# Patient Record
Sex: FEMALE | Race: WHITE | NOT HISPANIC OR LATINO | Employment: FULL TIME | URBAN - METROPOLITAN AREA
[De-identification: names, ages, dates, MRNs, and addresses within clinical notes are randomized per-mention and may not be internally consistent; named-entity substitution may affect disease eponyms.]

---

## 2017-01-25 ENCOUNTER — OFFICE VISIT (OUTPATIENT)
Dept: INTERNAL MEDICINE | Facility: CLINIC | Age: 42
End: 2017-01-25
Payer: COMMERCIAL

## 2017-01-25 VITALS
HEART RATE: 100 BPM | WEIGHT: 150.7 LBS | TEMPERATURE: 97.9 F | BODY MASS INDEX: 25.11 KG/M2 | SYSTOLIC BLOOD PRESSURE: 110 MMHG | HEIGHT: 65 IN | OXYGEN SATURATION: 100 % | DIASTOLIC BLOOD PRESSURE: 80 MMHG

## 2017-01-25 DIAGNOSIS — F90.0 ATTENTION DEFICIT HYPERACTIVITY DISORDER (ADHD), PREDOMINANTLY INATTENTIVE TYPE: Primary | ICD-10-CM

## 2017-01-25 DIAGNOSIS — Z00.00 ROUTINE GENERAL MEDICAL EXAMINATION AT A HEALTH CARE FACILITY: ICD-10-CM

## 2017-01-25 DIAGNOSIS — F33.0 MAJOR DEPRESSIVE DISORDER, RECURRENT EPISODE, MILD (H): ICD-10-CM

## 2017-01-25 LAB
ERYTHROCYTE [DISTWIDTH] IN BLOOD BY AUTOMATED COUNT: 13.4 % (ref 10–15)
HCT VFR BLD AUTO: 40.2 % (ref 35–47)
HGB BLD-MCNC: 13.2 G/DL (ref 11.7–15.7)
MCH RBC QN AUTO: 31.7 PG (ref 26.5–33)
MCHC RBC AUTO-ENTMCNC: 32.8 G/DL (ref 31.5–36.5)
MCV RBC AUTO: 96 FL (ref 78–100)
PLATELET # BLD AUTO: 380 10E9/L (ref 150–450)
RBC # BLD AUTO: 4.17 10E12/L (ref 3.8–5.2)
WBC # BLD AUTO: 5.4 10E9/L (ref 4–11)

## 2017-01-25 PROCEDURE — 36415 COLL VENOUS BLD VENIPUNCTURE: CPT | Performed by: INTERNAL MEDICINE

## 2017-01-25 PROCEDURE — G0145 SCR C/V CYTO,THINLAYER,RESCR: HCPCS | Performed by: INTERNAL MEDICINE

## 2017-01-25 PROCEDURE — 80053 COMPREHEN METABOLIC PANEL: CPT | Performed by: INTERNAL MEDICINE

## 2017-01-25 PROCEDURE — 99395 PREV VISIT EST AGE 18-39: CPT | Performed by: INTERNAL MEDICINE

## 2017-01-25 PROCEDURE — 85027 COMPLETE CBC AUTOMATED: CPT | Performed by: INTERNAL MEDICINE

## 2017-01-25 PROCEDURE — G0476 HPV COMBO ASSAY CA SCREEN: HCPCS | Performed by: INTERNAL MEDICINE

## 2017-01-25 PROCEDURE — 84443 ASSAY THYROID STIM HORMONE: CPT | Performed by: INTERNAL MEDICINE

## 2017-01-25 PROCEDURE — 80061 LIPID PANEL: CPT | Performed by: INTERNAL MEDICINE

## 2017-01-25 RX ORDER — DEXTROAMPHETAMINE SACCHARATE, AMPHETAMINE ASPARTATE MONOHYDRATE, DEXTROAMPHETAMINE SULFATE AND AMPHETAMINE SULFATE 2.5; 2.5; 2.5; 2.5 MG/1; MG/1; MG/1; MG/1
10 CAPSULE, EXTENDED RELEASE ORAL DAILY PRN
Qty: 30 CAPSULE | Refills: 0 | Status: SHIPPED | OUTPATIENT
Start: 2017-01-25 | End: 2017-03-31

## 2017-01-25 ASSESSMENT — PATIENT HEALTH QUESTIONNAIRE - PHQ9: 5. POOR APPETITE OR OVEREATING: SEVERAL DAYS

## 2017-01-25 ASSESSMENT — ANXIETY QUESTIONNAIRES
IF YOU CHECKED OFF ANY PROBLEMS ON THIS QUESTIONNAIRE, HOW DIFFICULT HAVE THESE PROBLEMS MADE IT FOR YOU TO DO YOUR WORK, TAKE CARE OF THINGS AT HOME, OR GET ALONG WITH OTHER PEOPLE: SOMEWHAT DIFFICULT
7. FEELING AFRAID AS IF SOMETHING AWFUL MIGHT HAPPEN: SEVERAL DAYS
5. BEING SO RESTLESS THAT IT IS HARD TO SIT STILL: SEVERAL DAYS
2. NOT BEING ABLE TO STOP OR CONTROL WORRYING: SEVERAL DAYS
6. BECOMING EASILY ANNOYED OR IRRITABLE: SEVERAL DAYS
1. FEELING NERVOUS, ANXIOUS, OR ON EDGE: SEVERAL DAYS
GAD7 TOTAL SCORE: 7
3. WORRYING TOO MUCH ABOUT DIFFERENT THINGS: SEVERAL DAYS

## 2017-01-25 NOTE — Clinical Note
[place] [address]  [facility] [address]  [phone]      February 3, 2017    Avril Piper  [address] [facility] [address]    Dear Avril,  We are happy to inform you that your PAP smear result from 1/25/17 is normal.  We are now able to do a follow up test on PAP smears. The DNA test is for HPV (Human Papilloma Virus). Cervical cancer is closely linked with certain types of HPV. Your result showed no evidence of HPV.  Therefore we recommend you return in 3 years for your next pap smear.  You will still need to return to the clinic every year for an annual exam and other preventive tests.  Please contact the clinic with any questions.  Sincerely,  Denise Jon MD

## 2017-01-25 NOTE — NURSING NOTE
"Chief Complaint   Patient presents with     Physical       Initial /80 mmHg  Wt 150 lb 11.2 oz (68.357 kg) Estimated body mass index is 24.69 kg/(m^2) as calculated from the following:    Height as of 4/22/15: 5' 5.5\" (1.664 m).    Weight as of this encounter: 150 lb 11.2 oz (68.357 kg).  BP completed using cuff size: large    "

## 2017-01-25 NOTE — PROGRESS NOTES
SUBJECTIVE:     CC: Avril Piper is an 41 year old woman who presents for preventive health visit.     Fasting physical.  Last pap 4- HPV positive. Last mammo over 5 years ago wnl per pt. Last Tdap UTD.    Healthy Habits:    Do you get at least three servings of calcium containing foods daily (dairy, green leafy vegetables, etc.)? yes    Amount of exercise or daily activities, outside of work: no    Problems taking medications regularly No    Medication side effects: No    Have you had an eye exam in the past two years? yes    Do you see a dentist twice per year? yes    Do you have sleep apnea, excessive snoring or daytime drowsiness?no      Today's PHQ-2 Score:   PHQ-2 ( 1999 [employer]) 4/3/2013 6/7/2012   Q1: Little interest or pleasure in doing things 0 0   Q2: Feeling down, depressed or hopeless 0 0   PHQ-2 Score 0 0       Abuse: Current or Past(Physical, Sexual or Emotional)- No  Do you feel safe in your environment - Yes    Social History   Substance Use Topics     Smoking status: Former Smoker     Types: Cigarettes     Smokeless tobacco: Never Used      Comment: very occasionally with friends      Alcohol Use: No     The patient does not drink >3 drinks per day nor >7 drinks per week.    Recent Labs   Lab Test  04/22/15   1045  02/27/12   0853   CHOL  209*  199   HDL  114  77   LDL  84  113   TRIG  53  45   CHOLHDLRATIO  1.8  2.6       Reviewed orders with patient.  Reviewed health maintenance and updated orders accordingly - Yes    Mammo Decision Support:  Mammogram not appropriate for this patient based on age.    Pertinent mammograms are reviewed under the imaging tab.  History of abnormal Pap smear: NO - age 30-65 PAP every 5 years with negative HPV co-testing recommended  All Histories reviewed and updated in Epic.      ROS:  C: NEGATIVE for fever, chills, change in weight  I: NEGATIVE for worrisome rashes, moles or lesions  E: NEGATIVE for vision changes or irritation  ENT: NEGATIVE for ear, mouth  "and throat problems  R: NEGATIVE for significant cough or SOB  B: NEGATIVE for masses, tenderness or discharge  CV: NEGATIVE for chest pain, palpitations or peripheral edema  GI: NEGATIVE for nausea, abdominal pain, heartburn, or change in bowel habits  : NEGATIVE for unusual urinary or vaginal symptoms. Periods are regular.  M: NEGATIVE for significant arthralgias or myalgia  N: NEGATIVE for weakness, dizziness or paresthesias  P: NEGATIVE for changes in mood or affect    Problem list, Medication list, Allergies, and Medical/Social/Surgical histories [facility] and updated as appropriate.  OBJECTIVE:     /80 mmHg  Pulse 100  Temp(Src) 97.9  F (36.6  C) (Oral)  Ht 5' 5\" (1.651 m)  Wt 150 lb 11.2 oz (68.357 kg)  BMI 25.08 kg/m2  SpO2 100%  Breastfeeding? No  EXAM:  GENERAL: healthy, alert and no distress  EYES: Eyes grossly normal to inspection, PERRL and conjunctivae and sclerae normal  HENT: ear canals and TM's normal, nose and mouth without ulcers or lesions  NECK: no adenopathy, no asymmetry, masses, or scars and thyroid normal to palpation  RESP: lungs clear to auscultation - no rales, rhonchi or wheezes  BREAST: normal without masses, tenderness or nipple discharge and no palpable axillary masses or adenopathy  CV: regular rate and rhythm, normal S1 S2, no S3 or S4, no murmur, click or rub, no peripheral edema and peripheral pulses strong  ABDOMEN: soft, nontender, no hepatosplenomegaly, no masses and bowel sounds normal   (female): normal female external genitalia, normal urethral meatus, vaginal mucosa pink, moist, well rugated, and normal cervix/adnexa/uterus without masses or discharge  MS: no gross musculoskeletal defects noted, no edema  SKIN: no suspicious lesions or rashes  NEURO: Normal strength and tone, mentation intact and speech normal  PSYCH: mentation appears normal, affect normal/bright    ASSESSMENT/PLAN:     1. Routine general medical examination at a [facility]" "facility     - Comprehensive metabolic panel (BMP + Alb, Alk Phos, ALT, AST, Total. Bili, TP)  - TSH with free T4 reflex  - CBC with platelets  - Lipid Profile with reflex to direct LDL  - Pap imaged thin layer screen with HPV - recommended age 30 - 65 years (select HPV order below)    2. Attention deficit hyperactivity disorder (ADHD), predominantly inattentive type     - amphetamine-dextroamphetamine (ADDERALL XR) 10 MG per 24 hr capsule; Take 1 capsule (10 mg) by mouth daily as needed  Dispense: 30 capsule; Refill: 0    3. Major depressive disorder, recurrent episode, mild (H)         COUNSELING:   Reviewed preventive health counseling, as reflected in patient instructions       Regular exercise       Healthy diet/nutrition    BP Screening:   Last 3 BP Readings:    BP Readings from Last 3 Encounters:   01/25/17 110/80   04/22/15 100/70   10/20/14 102/68       The following was recommended to the patient:  Re-screen BP within a year and recommended lifestyle modifications       reports that she has quit smoking. Her smoking use included Cigarettes. She has never used smokeless tobacco.    Estimated body mass index is 25.08 kg/(m^2) as calculated from the following:    Height as of this encounter: 5' 5\" (1.651 m).    Weight as of this encounter: 150 lb 11.2 oz (68.357 kg).   Weight management plan: Discussed healthy diet and exercise guidelines and patient will follow up in 12 months in clinic to re-evaluate.    Counseling Resources:  ATP IV Guidelines  Pooled Cohorts Equation Calculator  Breast Cancer Risk Calculator  FRAX Risk Assessment  ICSI Preventive Guidelines  Dietary Guidelines for Americans, 2010  USDA's MyPlate  ASA Prophylaxis  Lung CA Screening    Denise Jon MD  [facility]  "

## 2017-01-25 NOTE — MR AVS SNAPSHOT
After Visit Summary   1/25/2017    Avril Piper    MRN: [id number]           Patient Information     Date Of Birth          1975        Visit Information        Provider Department      1/25/2017 10:20 AM Denise Jon MD [facility]        Today's Diagnoses     Attention deficit hyperactivity disorder (ADHD), predominantly inattentive type    -  1     Routine general medical examination at a health care facility         Major depressive disorder, recurrent episode, mild (H)           Care Instructions      Preventive Health Recommendations  Female Ages 26 - 39  Yearly exam:   See your health care provider every year in order to    Review health changes.     Discuss preventive care.      Review your medicines if you your doctor has prescribed any.    Until age 30: Get a Pap test every three years (more often if you have had an abnormal result).    After age 30: Talk to your doctor about whether you should have a Pap test every 3 years or have a Pap test with HPV screening every 5 years.   You do not need a Pap test if your uterus was removed (hysterectomy) and you have not had cancer.  You should be tested each year for STDs (sexually transmitted diseases), if you're at risk.   Talk to your provider about how often to have your cholesterol checked.  If you are at risk for diabetes, you should have a diabetes test (fasting glucose).  Shots: Get a flu shot each year. Get a tetanus shot every 10 years.   Nutrition:     Eat at least 5 servings of fruits and vegetables each day.    Eat whole-grain bread, whole-wheat pasta and brown rice instead of white grains and rice.    Talk to your provider about Calcium and Vitamin D.     Lifestyle    Exercise at least 150 minutes a week (30 minutes a day, 5 days of the week). This will help you control your weight and prevent disease.    Limit alcohol to one drink per day.    No smoking.     Wear sunscreen to prevent skin cancer.    See your  "dentist every six months for an exam and cleaning.            Follow-ups after your visit        Who to contact     If you have questions or need follow up information about today's clinic visit or your schedule please contact [facility] directly at [phone].  Normal or non-critical lab and imaging results will be communicated to you by MyChart, letter or phone within 4 business days after the clinic has received the results. If you do not hear from us within 7 days, please contact the clinic through [employer] or phone. If you have a critical or abnormal lab result, we will notify you by phone as soon as possible.  Submit refill requests through [employer] or call your pharmacy and they will forward the refill request to us. Please allow 3 business days for your refill to be completed.          Additional Information About Your Visit        [employer] Information     [employer] lets you send messages to your doctor, view your test results, renew your prescriptions, schedule appointments and more. To sign up, go to [email or web address] . Click on \"Log in\" on the left side of the screen, which will take you to the Welcome page. Then click on \"Sign up Now\" on the right side of the page.     You will be asked to enter the access code listed below, as well as some personal information. Please follow the directions to create your username and password.     Your access code is: [email or web address]  Expires: 2017 10:57 AM     Your access code will  in 90 days. If you need help or a new code, please call your [facility] or [phone].        Care EveryWhere ID     This is your Care EveryWhere ID. This could be used by other organizations to access your [facility] medical records  [id number]        Your Vitals Were     Pulse Temperature Height BMI (Body Mass Index) Pulse Oximetry Breastfeeding?    100 97.9  F (36.6  C) (Oral) 5' 5\" (1.651 m) 25.08 kg/m2 100% No       Blood Pressure from Last 3 " Encounters:   01/25/17 110/80   04/22/15 100/70   10/20/14 102/68    Weight from Last 3 Encounters:   01/25/17 150 lb 11.2 oz (68.357 kg)   04/22/15 159 lb 4.8 oz (72.258 kg)   10/20/14 151 lb 4.8 oz (68.629 kg)              We Performed the Following     CBC with platelets     Comprehensive metabolic panel (BMP + Alb, Alk Phos, ALT, AST, Total. Bili, TP)     Lipid Profile with reflex to direct LDL     TSH with free T4 reflex          Today's Medication Changes          These changes are accurate as of: 1/25/17 10:57 AM.  If you have any questions, ask your nurse or doctor.               Start taking these medicines.        Dose/Directions    amphetamine-dextroamphetamine 10 MG per 24 hr capsule   Commonly known as:  ADDERALL XR   Used for:  Attention deficit hyperactivity disorder (ADHD), predominantly inattentive type   Started by:  Denise Jon MD        Dose:  10 mg   Take 1 capsule (10 mg) by mouth daily as needed   Quantity:  30 capsule   Refills:  0            Where to get your medicines      Some of these will need a paper prescription and others can be bought over the counter.  Ask your nurse if you have questions.     Bring a paper prescription for each of these medications    - amphetamine-dextroamphetamine 10 MG per 24 hr capsule             Primary Care Provider Office Phone # Fax #    Denise Jon [phone]       [facility] [address]  [facility] [address]        Thank you!     Thank you for choosing [facility]  for your care. Our goal is always to provide you with excellent care. Hearing back from our patients is one way we can continue to improve our services. Please take a few minutes to complete the written survey that you may receive in the mail after your visit with us. Thank you!             Your Updated Medication List - Protect others around you: Learn how to safely use, store and throw away your medicines at  www.disposemymeds.org.          This list is accurate as of: 1/25/17 10:57 AM.  Always use your most recent med list.                   Brand Name Dispense Instructions for use    amphetamine-dextroamphetamine 10 MG per 24 hr capsule    ADDERALL XR    30 capsule    Take 1 capsule (10 mg) by mouth daily as needed

## 2017-01-25 NOTE — Clinical Note
[place] [address], [address]  [place] [address]  Appointments: [phone]  Nurse:[phone]    January 30, 2017    Avril Piper  [address] [facility] [address]          Dear Avril,      The results of your recent blood tests were within acceptable limits.  If you have any further questions or problems, please contact our office.    Sincerely,      Denise Jon M.D.

## 2017-01-26 LAB
ALBUMIN SERPL-MCNC: 3.6 G/DL (ref 3.4–5)
ALP SERPL-CCNC: 69 U/L (ref 40–150)
ALT SERPL W P-5'-P-CCNC: 17 U/L (ref 0–50)
ANION GAP SERPL CALCULATED.3IONS-SCNC: 8 MMOL/L (ref 3–14)
AST SERPL W P-5'-P-CCNC: 16 U/L (ref 0–45)
BILIRUB SERPL-MCNC: 0.4 MG/DL (ref 0.2–1.3)
BUN SERPL-MCNC: 7 MG/DL (ref 7–30)
CALCIUM SERPL-MCNC: 9.1 MG/DL (ref 8.5–10.1)
CHLORIDE SERPL-SCNC: 105 MMOL/L (ref 94–109)
CHOLEST SERPL-MCNC: 207 MG/DL
CO2 SERPL-SCNC: 28 MMOL/L (ref 20–32)
CREAT SERPL-MCNC: 0.62 MG/DL (ref 0.52–1.04)
GFR SERPL CREATININE-BSD FRML MDRD: NORMAL ML/MIN/1.7M2
GLUCOSE SERPL-MCNC: 96 MG/DL (ref 70–99)
HDLC SERPL-MCNC: 108 MG/DL
LDLC SERPL CALC-MCNC: 88 MG/DL
NONHDLC SERPL-MCNC: 99 MG/DL
POTASSIUM SERPL-SCNC: 4.5 MMOL/L (ref 3.4–5.3)
PROT SERPL-MCNC: 7.6 G/DL (ref 6.8–8.8)
SODIUM SERPL-SCNC: 141 MMOL/L (ref 133–144)
TRIGL SERPL-MCNC: 56 MG/DL
TSH SERPL DL<=0.005 MIU/L-ACNC: 2.04 MU/L (ref 0.4–4)

## 2017-01-26 ASSESSMENT — PATIENT HEALTH QUESTIONNAIRE - PHQ9: SUM OF ALL RESPONSES TO PHQ QUESTIONS 1-9: 9

## 2017-01-26 ASSESSMENT — ANXIETY QUESTIONNAIRES: GAD7 TOTAL SCORE: 7

## 2017-01-30 LAB
COPATH REPORT: NORMAL
PAP: NORMAL

## 2017-01-31 LAB
FINAL DIAGNOSIS: NORMAL
HPV HR 12 DNA CVX QL NAA+PROBE: NEGATIVE
HPV16 DNA SPEC QL NAA+PROBE: NEGATIVE
HPV18 DNA SPEC QL NAA+PROBE: NEGATIVE
SPECIMEN DESCRIPTION: NORMAL

## 2017-02-03 NOTE — PROGRESS NOTES
Quick Note:    Pap done on 1/25/17. Please send nl pap and Neg HPV letter, (55244) for pap and HPV in 3 years and annual physical in 1 year.   Chart reviewed,  updated.     ADIA Viera RN    ______

## 2017-03-30 DIAGNOSIS — F90.0 ATTENTION DEFICIT HYPERACTIVITY DISORDER (ADHD), PREDOMINANTLY INATTENTIVE TYPE: ICD-10-CM

## 2017-03-30 NOTE — PATIENT INSTRUCTIONS
Reason for Call:  Medication or medication refill:    Do you use a [facility]?  Name of the pharmacy and phone number for the current request[facility] ([place] - [phone]    Name of the medication requested: adderall    Other request: none    Can we leave a detailed message on this number? YES    Phone number patient can be reached at: cell number on file [phone]    Best Time: any    Call taken on 3/30/2017 at 1:47 PM by Acacia Guajardo

## 2017-03-31 NOTE — TELEPHONE ENCOUNTER
Adderall XR      Last Written Prescription Date:  01/25/17  Last Fill Quantity: 30,   # refills: 0  Last Office Visit with [facility], [facility] or  Health prescribing provider: 01/25/17  Future Office visit:       Routing refill request to provider for review/approval because:  Drug not on the [facility], [facility] or [facility] refill protocol or controlled substance    NO CSA IN EPIC - please advise. [facility] when [facility].    Please advise, thanks.

## 2017-04-03 RX ORDER — DEXTROAMPHETAMINE SACCHARATE, AMPHETAMINE ASPARTATE MONOHYDRATE, DEXTROAMPHETAMINE SULFATE AND AMPHETAMINE SULFATE 2.5; 2.5; 2.5; 2.5 MG/1; MG/1; MG/1; MG/1
10 CAPSULE, EXTENDED RELEASE ORAL DAILY PRN
Qty: 30 CAPSULE | Refills: 0 | Status: SHIPPED | OUTPATIENT
Start: 2017-04-03 | End: 2018-03-27

## 2017-09-13 ENCOUNTER — TELEPHONE (OUTPATIENT)
Dept: INTERNAL MEDICINE | Facility: CLINIC | Age: 42
End: 2017-09-13

## 2017-09-13 DIAGNOSIS — F90.0 ATTENTION DEFICIT HYPERACTIVITY DISORDER (ADHD), PREDOMINANTLY INATTENTIVE TYPE: ICD-10-CM

## 2017-09-13 RX ORDER — DEXTROAMPHETAMINE SACCHARATE, AMPHETAMINE ASPARTATE MONOHYDRATE, DEXTROAMPHETAMINE SULFATE AND AMPHETAMINE SULFATE 2.5; 2.5; 2.5; 2.5 MG/1; MG/1; MG/1; MG/1
10 CAPSULE, EXTENDED RELEASE ORAL DAILY PRN
Qty: 30 CAPSULE | Refills: 0 | Status: CANCELLED | OUTPATIENT
Start: 2017-09-13

## 2017-09-13 NOTE — TELEPHONE ENCOUNTER
(Reason for Call:  Medication or medication refill:    Do you use a [facility]?  Name of the pharmacy and phone number for the current request:  [facility] [address] [place] - [phone]    Name of the medication requested: ADDERALL    Other request: NO    Can we leave a detailed message on this number? YES    Phone number patient can be reached at: Home number on file [phone] (home)    Best Time: ANY    Call taken on 9/13/2017 at 11:09 AM by Maribell Carvajal

## 2017-09-13 NOTE — TELEPHONE ENCOUNTER
Adderall 10mg      Last Written Prescription Date:  4/3/17  Last Fill Quantity: 30,   # refills: 0  Last Office Visit with [facility], [facility] or  Health prescribing provider: 1/25/17  Future Office visit:       Routing refill request to provider for review/approval because:  Drug not on the [facility], [facility] or  Health refill protocol or controlled substance

## 2017-10-30 ENCOUNTER — OFFICE VISIT (OUTPATIENT)
Dept: INTERNAL MEDICINE | Facility: CLINIC | Age: 42
End: 2017-10-30
Payer: COMMERCIAL

## 2017-10-30 VITALS
WEIGHT: 156.7 LBS | BODY MASS INDEX: 26.11 KG/M2 | HEIGHT: 65 IN | SYSTOLIC BLOOD PRESSURE: 100 MMHG | TEMPERATURE: 98.5 F | OXYGEN SATURATION: 94 % | DIASTOLIC BLOOD PRESSURE: 72 MMHG

## 2017-10-30 DIAGNOSIS — K21.00 GASTROESOPHAGEAL REFLUX DISEASE WITH ESOPHAGITIS: ICD-10-CM

## 2017-10-30 DIAGNOSIS — F90.0 ATTENTION DEFICIT HYPERACTIVITY DISORDER (ADHD), PREDOMINANTLY INATTENTIVE TYPE: Primary | ICD-10-CM

## 2017-10-30 DIAGNOSIS — N64.4 BREAST TENDERNESS: ICD-10-CM

## 2017-10-30 DIAGNOSIS — F41.9 ANXIETY: ICD-10-CM

## 2017-10-30 PROCEDURE — 99214 OFFICE O/P EST MOD 30 MIN: CPT | Performed by: INTERNAL MEDICINE

## 2017-10-30 RX ORDER — DEXTROAMPHETAMINE SACCHARATE, AMPHETAMINE ASPARTATE, DEXTROAMPHETAMINE SULFATE AND AMPHETAMINE SULFATE 2.5; 2.5; 2.5; 2.5 MG/1; MG/1; MG/1; MG/1
10 TABLET ORAL DAILY
Qty: 30 TABLET | Refills: 0 | Status: SHIPPED | OUTPATIENT
Start: 2017-10-30 | End: 2017-12-06

## 2017-10-30 NOTE — MR AVS SNAPSHOT
"              After Visit Summary   10/30/2017    Avril Piper    MRN: [id number]           Patient Information     Date Of Birth          1975        Visit Information        Provider Department      10/30/2017 3:00 PM Denise Jon MD [facility]        Today's Diagnoses     Attention deficit hyperactivity disorder (ADHD), predominantly inattentive type    -  1    Gastroesophageal reflux disease with esophagitis        Anxiety        Breast tenderness           Follow-ups after your visit        Who to contact     If you have questions or need follow up information about today's clinic visit or your schedule please contact [facility] directly at [phone].  Normal or non-critical lab and imaging results will be communicated to you by MyChart, letter or phone within 4 business days after the clinic has received the results. If you do not hear from us within 7 days, please contact the clinic through [employer] or phone. If you have a critical or abnormal lab result, we will notify you by phone as soon as possible.  Submit refill requests through [employer] or call your pharmacy and they will forward the refill request to us. Please allow 3 business days for your refill to be completed.          Additional Information About Your Visit        MyChart Information     [employer] lets you send messages to your doctor, view your test results, renew your prescriptions, schedule appointments and more. To sign up, go to [email or web address] . Click on \"Log in\" on the left side of the screen, which will take you to the Welcome page. Then click on \"Sign up Now\" on the right side of the page.     You will be asked to enter the access code listed below, as well as some personal information. Please follow the directions to create your username and password.     Your access code is: [email or web address]  Expires: 2018  2:07 PM     Your access code will  in 90 days. If you need help or a " "new code, please call your [place] clinic or [phone].        Care EveryWhere ID     This is your Care EveryWhere ID. This could be used by other organizations to access your [place] medical records  [id number]        Your Vitals Were     Temperature Height Last Period Pulse Oximetry Breastfeeding? BMI (Body Mass Index)    98.5  F (36.9  C) (Oral) 5' 5\" (1.651 m) 10/23/2017 94% No 26.08 kg/m2       Blood Pressure from Last 3 Encounters:   10/30/17 100/72   01/25/17 110/80   04/22/15 100/70    Weight from Last 3 Encounters:   10/30/17 156 lb 11.2 oz (71.1 kg)   01/25/17 150 lb 11.2 oz (68.4 kg)   04/22/15 159 lb 4.8 oz (72.3 kg)              Today, you had the following     No orders found for display         Today's Medication Changes          These changes are accurate as of: 10/30/17 11:59 PM.  If you have any questions, ask your nurse or doctor.               These medicines have changed or have updated prescriptions.        Dose/Directions    * amphetamine-dextroamphetamine 10 MG per 24 hr capsule   Commonly known as:  ADDERALL XR   This may have changed:  Another medication with the same name was added. Make sure you understand how and when to take each.   Used for:  Attention deficit hyperactivity disorder (ADHD), predominantly inattentive type   Changed by:  Denise Jon MD        Dose:  10 mg   Take 1 capsule (10 mg) by mouth daily as needed   Quantity:  30 capsule   Refills:  0       * amphetamine-dextroamphetamine 10 MG per tablet   Commonly known as:  ADDERALL   This may have changed:  You were already taking a medication with the same name, and this prescription was added. Make sure you understand how and when to take each.   Used for:  Attention deficit hyperactivity disorder (ADHD), predominantly inattentive type   Changed by:  Denise Jon MD        Dose:  10 mg   Take 1 tablet (10 mg) by mouth daily   Quantity:  30 tablet   Refills:  0       * Notice:  This list has 2 " medication(s) that are the same as other medications prescribed for you. Read the directions carefully, and ask your doctor or other care provider to review them with you.         Where to get your medicines      Some of these will need a paper prescription and others can be bought over the counter.  Ask your nurse if you have questions.     Bring a paper prescription for each of these medications     amphetamine-dextroamphetamine 10 MG per tablet                Primary Care Provider Office Phone # Fax #    Denise Jon [phone]       [address] [facility] [address]  [facility] [address]        Equal Access to Services     [facility]: Hadii aad ku hadasho Soomaali, waaxda luqadaha, qaybta kaalmada adeegyada, waxtigist north . So [facility] [phone].    ATENCIÓN: Si habla español, tiene a love disposición servicios gratuitos de asistencia lingüística. [facility] [phone].    We comply with applicable federal civil rights laws and [place] laws. We do not discriminate on the basis of race, color, national origin, age, disability, sex, sexual orientation, or gender identity.            Thank you!     Thank you for choosing [facility]  for your care. Our goal is always to provide you with excellent care. Hearing back from our patients is one way we can continue to improve our services. Please take a few minutes to complete the written survey that you may receive in the mail after your visit with us. Thank you!             Your Updated Medication List - Protect others around you: Learn how to safely use, store and throw away your medicines at www.disposemymeds.org.          This list is accurate as of: 10/30/17 11:59 PM.  Always use your most recent med list.                   Brand Name Dispense Instructions for use Diagnosis    * amphetamine-dextroamphetamine 10 MG per 24 hr capsule    ADDERALL XR    30 capsule    Take 1 capsule (10 mg) by mouth daily as  needed    Attention deficit hyperactivity disorder (ADHD), predominantly inattentive type       * amphetamine-dextroamphetamine 10 MG per tablet    ADDERALL    30 tablet    Take 1 tablet (10 mg) by mouth daily    Attention deficit hyperactivity disorder (ADHD), predominantly inattentive type       * Notice:  This list has 2 medication(s) that are the same as other medications prescribed for you. Read the directions carefully, and ask your doctor or other care provider to review them with you.

## 2017-10-30 NOTE — PROGRESS NOTES
SUBJECTIVE:   Avril Piper is a 42 year old female who presents to clinic today for the following health issues:    Follow up ADHD and neck problem.    HPI:   She has been stressed for the last 2 years. She is drinking 8 oz of wine every night, she drinks caffeine every day. She is waking about about 2 am most nights with an upset stomach. Sometimes with it her throat gets sore.     She also is getting breast tenderness around the time of her period that can be severe.    Her ADD has been under control but she thinks she med is giving her headaches. She has been on tablets instead of capsules in the past and would like to try going back to the tablets to see if her headaches get better.     Problem list and histories reviewed & adjusted, as indicated.  Additional history: as documented    Patient Active Problem List   Diagnosis     Menstrual irregularity     Head ache     Mild major depression (H)     Insertion of Mirena     CARDIOVASCULAR SCREENING; LDL GOAL LESS THAN 160     Dyspareunia     ADHD (attention deficit hyperactivity disorder)     Anxiety     High risk HPV infection     Past Surgical History:   Procedure Laterality Date     ESOPHAGOSCOPY, GASTROSCOPY, DUODENOSCOPY (EGD), COMBINED  8/10/2012    Procedure: COMBINED ESOPHAGOSCOPY, GASTROSCOPY, DUODENOSCOPY (EGD);  ESOPHAGOSCOPY, GASTROSCOPY, DUODENOSCOPY (EGD)  ;  Surgeon: Tyron Begum MD;  Location:  GI     HC DILATION/CURETTAGE DIAG/THER NON OB        cysts removed from ovaries       Social History   Substance Use Topics     Smoking status: Former Smoker     Types: Cigarettes     Smokeless tobacco: Never Used      Comment: very occasionally with friends      Alcohol use No     Family History   Problem Relation Age of Onset     Hypertension Father      DIABETES Father      Liver Disease Father      Hep C     CANCER Maternal Grandmother      Ovarian cancer     Thyroid Disease Mother      HEART DISEASE Other      aunt, heart attack      "        Reviewed and updated as needed this visit by clinical staffTobacco  Allergies  Med Hx  Surg Hx  Fam Hx  Soc Hx      Reviewed and updated as needed this visit by Provider         ROS:  Constitutional, HEENT, cardiovascular, pulmonary, GI, , musculoskeletal, neuro, skin, endocrine and psych systems are negative, except as otherwise noted.      OBJECTIVE:   /72 (BP Location: Right arm, Patient Position: Chair, Cuff Size: Adult Large)  Temp 98.5  F (36.9  C) (Oral)  Ht 5' 5\" (1.651 m)  Wt 156 lb 11.2 oz (71.1 kg)  LMP 10/23/2017  SpO2 94%  Breastfeeding? No  BMI 26.08 kg/m2  Body mass index is 26.08 kg/(m^2).  GENERAL: healthy, alert and no distress  EYES: Eyes grossly normal to inspection, PERRL and conjunctivae and sclerae normal  HENT: ear canals and TM's normal, nose and mouth without ulcers or lesions  NECK: no adenopathy, no asymmetry, masses, or scars and thyroid normal to palpation  RESP: lungs clear to auscultation - no rales, rhonchi or wheezes  CV: regular rate and rhythm, normal S1 S2, no S3 or S4, no murmur, click or rub, no peripheral edema and peripheral pulses strong  ABDOMEN: soft, nontender, no hepatosplenomegaly, no masses and bowel sounds normal  MS: no gross musculoskeletal defects noted, no edema  PSYCH: mentation appears normal, affect normal/bright      ASSESSMENT/PLAN:       1. Attention deficit hyperactivity disorder (ADHD), predominantly inattentive type     - amphetamine-dextroamphetamine (ADDERALL) 10 MG per tablet; Take 1 tablet (10 mg) by mouth daily  Dispense: 30 tablet; Refill: 0    2. Gastroesophageal reflux disease with esophagitis  recommended she cut back on wine and caffiene and try OTC Zantac  mg qd Follow up if no improvement.     3. Anxiety       4. Breast tenderness  Again recommended stooping caffeine and trying primrose oil. Follow up if no improvement.         Denise Jon MD  [facility]  "

## 2017-10-30 NOTE — NURSING NOTE
"Chief Complaint   Patient presents with     RECHECK     A.D.H.D     Neck Problem       Initial /72 (BP Location: Right arm, Patient Position: Chair, Cuff Size: Adult Large)  Temp 98.5  F (36.9  C) (Oral)  Ht 5' 5\" (1.651 m)  Wt 156 lb 11.2 oz (71.1 kg)  LMP 10/23/2017  SpO2 94%  Breastfeeding? No  BMI 26.08 kg/m2 Estimated body mass index is 26.08 kg/(m^2) as calculated from the following:    Height as of this encounter: 5' 5\" (1.651 m).    Weight as of this encounter: 156 lb 11.2 oz (71.1 kg).  Medication Reconciliation: complete    "

## 2017-12-06 ENCOUNTER — TELEPHONE (OUTPATIENT)
Dept: INTERNAL MEDICINE | Facility: CLINIC | Age: 42
End: 2017-12-06

## 2017-12-06 DIAGNOSIS — F90.0 ATTENTION DEFICIT HYPERACTIVITY DISORDER (ADHD), PREDOMINANTLY INATTENTIVE TYPE: ICD-10-CM

## 2017-12-06 RX ORDER — DEXTROAMPHETAMINE SACCHARATE, AMPHETAMINE ASPARTATE, DEXTROAMPHETAMINE SULFATE AND AMPHETAMINE SULFATE 2.5; 2.5; 2.5; 2.5 MG/1; MG/1; MG/1; MG/1
10 TABLET ORAL DAILY
Qty: 30 TABLET | Refills: 0 | Status: SHIPPED | OUTPATIENT
Start: 2017-12-06 | End: 2018-01-22

## 2017-12-06 NOTE — TELEPHONE ENCOUNTER
(Reason for Call:  Medication or medication refill:    Do you use a [facility]?  Name of the pharmacy and phone number for the current request:  [facility] [address] [facility] [address] [place] - [phone]    Name of the medication requested: Adderall    Other request: no    Can we leave a detailed message on this number? YES    Phone number patient can be reached at: Cell number on file:    Telephone Information:   Mobile [phone]       Best Time: any    Call taken on 12/6/2017 at 2:25 PM by Maribell Carvajal

## 2018-01-22 DIAGNOSIS — F90.0 ATTENTION DEFICIT HYPERACTIVITY DISORDER (ADHD), PREDOMINANTLY INATTENTIVE TYPE: ICD-10-CM

## 2018-01-22 RX ORDER — DEXTROAMPHETAMINE SACCHARATE, AMPHETAMINE ASPARTATE, DEXTROAMPHETAMINE SULFATE AND AMPHETAMINE SULFATE 2.5; 2.5; 2.5; 2.5 MG/1; MG/1; MG/1; MG/1
10 TABLET ORAL DAILY
Qty: 30 TABLET | Refills: 0 | Status: SHIPPED | OUTPATIENT
Start: 2018-01-22 | End: 2018-02-23

## 2018-01-22 NOTE — TELEPHONE ENCOUNTER
Adderall tablets (does not want capsules)      Last Written Prescription Date:  12/6/17  Last Fill Quantity: 30,   # refills: 0  Last Office Visit: 10/30/17  Future Office visit:       Routing refill request to provider for review/approval because:  Drug not on the [facility], [facility] or [facility] refill protocol or controlled substance

## 2018-02-23 ENCOUNTER — TELEPHONE (OUTPATIENT)
Dept: INTERNAL MEDICINE | Facility: CLINIC | Age: 43
End: 2018-02-23

## 2018-02-23 DIAGNOSIS — F90.0 ATTENTION DEFICIT HYPERACTIVITY DISORDER (ADHD), PREDOMINANTLY INATTENTIVE TYPE: ICD-10-CM

## 2018-02-23 NOTE — TELEPHONE ENCOUNTER
amphetamine-dextroamphetamine (ADDERALL) 10 MG per tablet     Last Written Prescription Date:  01/22/18  Last Fill Quantity: 30,   # refills: 1  Last Office Visit: 10/30/17  Future Office visit:       Routing refill request to provider for review/approval because:  Drug not on the [facility], [facility] or [facility] refill protocol or controlled substance    Was not sure who to route this script to in Dr Jon's absence...    Please call patient at [phone] when scripts are walked to [facility] for her and her son James Said

## 2018-02-26 RX ORDER — DEXTROAMPHETAMINE SACCHARATE, AMPHETAMINE ASPARTATE, DEXTROAMPHETAMINE SULFATE AND AMPHETAMINE SULFATE 2.5; 2.5; 2.5; 2.5 MG/1; MG/1; MG/1; MG/1
10 TABLET ORAL DAILY
Qty: 30 TABLET | Refills: 0 | Status: SHIPPED | OUTPATIENT
Start: 2018-02-26 | End: 2018-08-16

## 2018-02-26 RX ORDER — DEXTROAMPHETAMINE SACCHARATE, AMPHETAMINE ASPARTATE, DEXTROAMPHETAMINE SULFATE AND AMPHETAMINE SULFATE 2.5; 2.5; 2.5; 2.5 MG/1; MG/1; MG/1; MG/1
10 TABLET ORAL DAILY
Qty: 30 TABLET | Refills: 0 | Status: SHIPPED | OUTPATIENT
Start: 2018-02-26 | End: 2021-10-12

## 2018-02-26 NOTE — TELEPHONE ENCOUNTER
No signed CSA form in chart.    [place] reviewed: access not granted by provider.    [place]:  [email or web address]    Please advise, thanks.

## 2018-03-27 DIAGNOSIS — F90.0 ATTENTION DEFICIT HYPERACTIVITY DISORDER (ADHD), PREDOMINANTLY INATTENTIVE TYPE: ICD-10-CM

## 2018-03-27 RX ORDER — DEXTROAMPHETAMINE SACCHARATE, AMPHETAMINE ASPARTATE MONOHYDRATE, DEXTROAMPHETAMINE SULFATE AND AMPHETAMINE SULFATE 2.5; 2.5; 2.5; 2.5 MG/1; MG/1; MG/1; MG/1
10 CAPSULE, EXTENDED RELEASE ORAL DAILY PRN
Qty: 30 CAPSULE | Refills: 0 | Status: SHIPPED | OUTPATIENT
Start: 2018-03-27 | End: 2018-04-26

## 2018-04-26 DIAGNOSIS — F90.0 ATTENTION DEFICIT HYPERACTIVITY DISORDER (ADHD), PREDOMINANTLY INATTENTIVE TYPE: ICD-10-CM

## 2018-04-26 NOTE — TELEPHONE ENCOUNTER
Take rx down to [facility].    Adderall XR 10 mg      Last Written Prescription Date:  3/27/18  Last Fill Quantity: 30,   # refills: 0  Last Office Visit: 10/30/17  Future Office visit:       Routing refill request to provider for review/approval because:  Drug not on the [facility], [facility] or [facility] refill protocol or controlled substance  No CSA on file  RX [place] program ([place]) reviewed: Unable to review, no access granted by MD.    [place] profile:  [email or web address]

## 2018-04-27 RX ORDER — DEXTROAMPHETAMINE SACCHARATE, AMPHETAMINE ASPARTATE MONOHYDRATE, DEXTROAMPHETAMINE SULFATE AND AMPHETAMINE SULFATE 2.5; 2.5; 2.5; 2.5 MG/1; MG/1; MG/1; MG/1
10 CAPSULE, EXTENDED RELEASE ORAL DAILY PRN
Qty: 30 CAPSULE | Refills: 0 | Status: SHIPPED | OUTPATIENT
Start: 2018-04-27 | End: 2018-06-01

## 2018-04-27 NOTE — TELEPHONE ENCOUNTER
I am not sure why I got this refill request?? Pl forward to who ever is covering for Dr Jon with  the last names starting with E

## 2018-06-01 DIAGNOSIS — F90.0 ATTENTION DEFICIT HYPERACTIVITY DISORDER (ADHD), PREDOMINANTLY INATTENTIVE TYPE: ICD-10-CM

## 2018-06-01 NOTE — TELEPHONE ENCOUNTER
Pt called. Needs refill on Adderall. Wants brought down to [facility]      Last refill-4/27/18-#30    Last OV-10/30/17    No CSA on file

## 2018-06-01 NOTE — TELEPHONE ENCOUNTER
Reason for Call:  Medication or medication refill:    Do you use a [facility]?non      Name of the pharmacy and phone number for the current request:  none    Name of the medication requested: none    Other request: none    Can we leave a detailed message on this number? NO    Phone number patient can be reached at: Other phone number:  none    Best Time: never     Call taken on 6/1/2018 at 1:46 PM by Rina Velez

## 2018-06-04 RX ORDER — DEXTROAMPHETAMINE SACCHARATE, AMPHETAMINE ASPARTATE MONOHYDRATE, DEXTROAMPHETAMINE SULFATE AND AMPHETAMINE SULFATE 2.5; 2.5; 2.5; 2.5 MG/1; MG/1; MG/1; MG/1
10 CAPSULE, EXTENDED RELEASE ORAL DAILY PRN
Qty: 30 CAPSULE | Refills: 0 | Status: SHIPPED | OUTPATIENT
Start: 2018-06-04 | End: 2018-08-16 | Stop reason: SINTOL

## 2018-08-01 ENCOUNTER — TELEPHONE (OUTPATIENT)
Dept: INTERNAL MEDICINE | Facility: CLINIC | Age: 43
End: 2018-08-01

## 2018-08-01 DIAGNOSIS — F90.0 ATTENTION DEFICIT HYPERACTIVITY DISORDER (ADHD), PREDOMINANTLY INATTENTIVE TYPE: ICD-10-CM

## 2018-08-01 NOTE — TELEPHONE ENCOUNTER
Received request for Adderall 10 mg, it looks like she has had both XR and non-XR in past.  Unsure which she is supposed to be getting.  Last rx was for the XR 10 mg written 6/4/18.  Last office visit 10/30/17.      No controlled substance agreement  RX [place] program ([place]) reviewed:  reviewed- no concerns    [place] profile:  [email or web address] - [employer] across [employer]/

## 2018-08-01 NOTE — TELEPHONE ENCOUNTER
Reason for Call:  Medication or medication refill:    Do you use a [facility]?  Name of the pharmacy and phone number for the current request:  [facility] [address] ([place]) - [phone]    Name of the medication requested: adderall 10 mg    Other request: none    Can we leave a detailed message on this number? YES    Phone number patient can be reached at: Cell number on file:    Telephone Information:   Mobile [phone]       Best Time: any    Call taken on 8/1/2018 at 11:57 AM by Acacia Guajardo

## 2018-08-16 NOTE — TELEPHONE ENCOUNTER
Call placed to patient, she states she has gotten rxs in the past for both the immediate release and the extended release at different times but feels that the XR gives her headaches.  Would prefer the immediate release 10 mg.  BO Tamayo R.N.

## 2018-08-17 RX ORDER — DEXTROAMPHETAMINE SACCHARATE, AMPHETAMINE ASPARTATE, DEXTROAMPHETAMINE SULFATE AND AMPHETAMINE SULFATE 2.5; 2.5; 2.5; 2.5 MG/1; MG/1; MG/1; MG/1
10 TABLET ORAL DAILY
Qty: 30 TABLET | Refills: 0 | Status: SHIPPED | OUTPATIENT
Start: 2018-08-17 | End: 2018-11-15

## 2018-08-17 RX ORDER — DEXTROAMPHETAMINE SACCHARATE, AMPHETAMINE ASPARTATE, DEXTROAMPHETAMINE SULFATE AND AMPHETAMINE SULFATE 2.5; 2.5; 2.5; 2.5 MG/1; MG/1; MG/1; MG/1
10 TABLET ORAL DAILY
Qty: 30 TABLET | Refills: 0 | Status: SHIPPED | OUTPATIENT
Start: 2018-09-14 | End: 2018-11-12

## 2018-11-09 ENCOUNTER — TELEPHONE (OUTPATIENT)
Dept: INTERNAL MEDICINE | Facility: CLINIC | Age: 43
End: 2018-11-09

## 2018-11-09 DIAGNOSIS — F90.0 ATTENTION DEFICIT HYPERACTIVITY DISORDER (ADHD), PREDOMINANTLY INATTENTIVE TYPE: ICD-10-CM

## 2018-11-09 NOTE — TELEPHONE ENCOUNTER
Request for refill of adderall.    Last OV 10/30/17, last filled 9/14/18 qty 30.  Routing refill request to provider for review/approval because:  Drug not on the [facility] refill protocol   [facility]

## 2018-11-09 NOTE — TELEPHONE ENCOUNTER
Reason for Call:  Medication or medication refill:    Do you use a [facility]?  Name of the pharmacy and phone number for the current request:  [facility] [address] [facility]) - [phone]    Name of the medication requested: adderall 10mg    Other request: none    Can we leave a detailed message on this number? YES    Phone number patient can be reached at: Home number on file [phone]    Best Time: any    Call taken on 11/9/2018 at 12:18 PM by Bethany Higginbotham

## 2018-11-12 RX ORDER — DEXTROAMPHETAMINE SACCHARATE, AMPHETAMINE ASPARTATE, DEXTROAMPHETAMINE SULFATE AND AMPHETAMINE SULFATE 2.5; 2.5; 2.5; 2.5 MG/1; MG/1; MG/1; MG/1
10 TABLET ORAL DAILY
Qty: 30 TABLET | Refills: 0 | Status: SHIPPED | OUTPATIENT
Start: 2018-11-12 | End: 2018-11-15

## 2018-11-15 ENCOUNTER — OFFICE VISIT (OUTPATIENT)
Dept: INTERNAL MEDICINE | Facility: CLINIC | Age: 43
End: 2018-11-15
Payer: COMMERCIAL

## 2018-11-15 VITALS
TEMPERATURE: 98.1 F | SYSTOLIC BLOOD PRESSURE: 106 MMHG | HEIGHT: 65 IN | HEART RATE: 95 BPM | BODY MASS INDEX: 26.16 KG/M2 | OXYGEN SATURATION: 100 % | WEIGHT: 157 LBS | DIASTOLIC BLOOD PRESSURE: 72 MMHG | RESPIRATION RATE: 18 BRPM

## 2018-11-15 DIAGNOSIS — K21.00 GASTROESOPHAGEAL REFLUX DISEASE WITH ESOPHAGITIS: ICD-10-CM

## 2018-11-15 DIAGNOSIS — Z83.49 FAMILY HISTORY OF THYROID DISEASE: ICD-10-CM

## 2018-11-15 DIAGNOSIS — R07.0 THROAT DISCOMFORT: Primary | ICD-10-CM

## 2018-11-15 LAB
DEPRECATED S PYO AG THROAT QL EIA: NORMAL
SPECIMEN SOURCE: NORMAL

## 2018-11-15 PROCEDURE — 84443 ASSAY THYROID STIM HORMONE: CPT | Performed by: NURSE PRACTITIONER

## 2018-11-15 PROCEDURE — 99213 OFFICE O/P EST LOW 20 MIN: CPT | Performed by: NURSE PRACTITIONER

## 2018-11-15 PROCEDURE — 87880 STREP A ASSAY W/OPTIC: CPT | Performed by: NURSE PRACTITIONER

## 2018-11-15 PROCEDURE — 86376 MICROSOMAL ANTIBODY EACH: CPT | Performed by: NURSE PRACTITIONER

## 2018-11-15 PROCEDURE — 36415 COLL VENOUS BLD VENIPUNCTURE: CPT | Performed by: NURSE PRACTITIONER

## 2018-11-15 PROCEDURE — 86800 THYROGLOBULIN ANTIBODY: CPT | Performed by: NURSE PRACTITIONER

## 2018-11-15 PROCEDURE — 84439 ASSAY OF FREE THYROXINE: CPT | Performed by: NURSE PRACTITIONER

## 2018-11-15 PROCEDURE — 87081 CULTURE SCREEN ONLY: CPT | Performed by: NURSE PRACTITIONER

## 2018-11-15 ASSESSMENT — PATIENT HEALTH QUESTIONNAIRE - PHQ9
SUM OF ALL RESPONSES TO PHQ QUESTIONS 1-9: 10
SUM OF ALL RESPONSES TO PHQ QUESTIONS 1-9: 10

## 2018-11-15 NOTE — MR AVS SNAPSHOT
"              After Visit Summary   11/15/2018    Avril Piper    MRN: [id number]           Patient Information     Date Of Birth          1975        Visit Information        Provider Department      11/15/2018 1:40 PM Kiesha Shen APRN CNP [facility]        Today's Diagnoses     Throat discomfort    -  1    Family history of thyroid disease        Gastroesophageal reflux disease with esophagitis          Care Instructions    Lab  In [address]    Avoiding chocolate, coffee, peppermint, fruit juices, tomatoes,NSAID's, greasy and spicy foods. Encouraged moderation of alcoholic beverages    Ranitidine twice daily for 2-3 weeks     If symptoms improve you can use ranitidine as needed     If symptoms do not improve would consider an endoscopy              Follow-ups after your visit        Who to contact     If you have questions or need follow up information about today's clinic visit or your schedule please contact [facility] directly at [phone].  Normal or non-critical lab and imaging results will be communicated to you by MyChart, letter or phone within 4 business days after the clinic has received the results. If you do not hear from us within 7 days, please contact the clinic through MyChart or phone. If you have a critical or abnormal lab result, we will notify you by phone as soon as possible.  Submit refill requests through [employer] or call your pharmacy and they will forward the refill request to us. Please allow 3 business days for your refill to be completed.          Additional Information About Your Visit        Care EveryWhere ID     This is your Care EveryWhere ID. This could be used by other organizations to access your [facility] medical records  [id number]        Your Vitals Were     Pulse Temperature Respirations Height Pulse Oximetry BMI (Body Mass Index)    95 98.1  F (36.7  C) (Pulmonary Artery) 18 5' 5\" (1.651 m) 100% 26.13 kg/m2       Blood " Pressure from Last 3 Encounters:   11/15/18 106/72   10/30/17 100/72   01/25/17 110/80    Weight from Last 3 Encounters:   11/15/18 157 lb (71.2 kg)   10/30/17 156 lb 11.2 oz (71.1 kg)   01/25/17 150 lb 11.2 oz (68.4 kg)              We Performed the Following     Anti thyroglobulin antibody     Strep, Rapid Screen     T4 free     Thyroid peroxidase antibody     TSH          Today's Medication Changes          These changes are accurate as of 11/15/18  2:26 PM.  If you have any questions, ask your nurse or doctor.               Start taking these medicines.        Dose/Directions    ranitidine 150 MG tablet   Commonly known as:  ZANTAC   Used for:  Throat discomfort, Gastroesophageal reflux disease with esophagitis   Started by:  Kiesha Shen APRN CNP        Dose:  150 mg   Take 1 tablet (150 mg) by mouth 2 times daily   Quantity:  60 tablet   Refills:  1            Where to get your medicines      These medications were sent to [address] [address]     Phone:  [phone]     ranitidine 150 MG tablet                Primary Care Provider Office Phone # Fax #    Denise Jon [phone]       [address] [address]        Equal Access to Services     GARTH NEUMANN AH: Hadii aad ku hadasho Soomaali, waaxda luqadaha, qaybta kaalmada adeegyada, waxtigist nolascoin hayaan juan arora. So [facility] [phone].    ATENCIÓN: Si habla español, tiene a love disposición servicios gratuitos de asistencia lingüística. Megan al [phone].    We comply with applicable federal civil rights laws and [place] laws. We do not discriminate on the basis of race, color, national origin, age, disability, sex, sexual orientation, or gender identity.            Thank you!     Thank you for choosing [facility]  for your care. Our goal is always to provide you with excellent  care. Hearing back from our patients is one way we can continue to improve our services. Please take a few minutes to complete the written survey that you may receive in the mail after your visit with us. Thank you!             Your Updated Medication List - Protect others around you: Learn how to safely use, store and throw away your medicines at www.disposemymeds.org.          This list is accurate as of 11/15/18  2:26 PM.  Always use your most recent med list.                   Brand Name Dispense Instructions for use Diagnosis    amphetamine-dextroamphetamine 10 MG per tablet    ADDERALL    30 tablet    Take 1 tablet (10 mg) by mouth daily    Attention deficit hyperactivity disorder (ADHD), predominantly inattentive type       MULTIVITAMIN ADULT PO           ranitidine 150 MG tablet    ZANTAC    60 tablet    Take 1 tablet (150 mg) by mouth 2 times daily    Throat discomfort, Gastroesophageal reflux disease with esophagitis

## 2018-11-15 NOTE — NURSING NOTE
"Chief Complaint   Patient presents with     Pharyngitis     pt c/o a sore throat x 5 days.     initial /72  Pulse 95  Temp 98.1  F (36.7  C) (Pulmonary Artery)  Resp 18  Ht 5' 5\" (1.651 m)  Wt 157 lb (71.2 kg)  SpO2 100%  BMI 26.13 kg/m2 Estimated body mass index is 26.13 kg/(m^2) as calculated from the following:    Height as of this encounter: 5' 5\" (1.651 m).    Weight as of this encounter: 157 lb (71.2 kg)..  bp completed using cuff size regular  NED CASTRO LPN  "

## 2018-11-15 NOTE — PROGRESS NOTES
.  SUBJECTIVE:   Avril Piper is a 43 year old female who presents to clinic today for the following health issues:  Answers for HPI/ROS submitted by the patient on 11/15/2018   PHQ9 TOTAL SCORE: 10  Chief Complaint   Patient presents with     Pharyngitis     pt c/o a sore throat x 5 days.   She has not a sore throat  She feels like hard to swallow at times and sometimes feel she is gulping and cannot swallow   Her mother has thyroid issues and she wonders if this could be the problem  Denies any constipation, diarrhea, hot or cold instability, hair loss- but shaves her head or any other symptoms   Has some acid issues at times - takes no medication        Problem list and histories reviewed & adjusted, as indicated.  Additional history: as documented    Patient Active Problem List   Diagnosis     Menstrual irregularity     Head ache     Mild major depression (H)     Insertion of Mirena     CARDIOVASCULAR SCREENING; LDL GOAL LESS THAN 160     Dyspareunia     ADHD (attention deficit hyperactivity disorder)     Anxiety     High risk HPV infection     Past Surgical History:   Procedure Laterality Date     ESOPHAGOSCOPY, GASTROSCOPY, DUODENOSCOPY (EGD), COMBINED  8/10/2012    Procedure: COMBINED ESOPHAGOSCOPY, GASTROSCOPY, DUODENOSCOPY (EGD);  ESOPHAGOSCOPY, GASTROSCOPY, DUODENOSCOPY (EGD)  ;  Surgeon: Tyron Begum MD;  Location:  GI     HC DILATION/CURETTAGE DIAG/THER NON OB        cysts removed from ovaries       Social History   Substance Use Topics     Smoking status: Former Smoker     Types: Cigarettes     Smokeless tobacco: Never Used      Comment: very occasionally with friends      Alcohol use No     Family History   Problem Relation Age of Onset     Hypertension Father      Diabetes Father      Liver Disease Father      Hep C     Cancer Maternal Grandmother      Ovarian cancer     Thyroid Disease Mother      HEART DISEASE Other      aunt, heart attack           Reviewed and updated as needed this visit  "by clinical staff  Tobacco  Allergies  Meds  Med Hx  Surg Hx  Fam Hx  Soc Hx      Reviewed and updated as needed this visit by Provider         ROS:  Constitutional, HEENT, cardiovascular, pulmonary, gi and gu systems are negative, except as otherwise noted.    OBJECTIVE:     /72  Pulse 95  Temp 98.1  F (36.7  C) (Pulmonary Artery)  Resp 18  Ht 5' 5\" (1.651 m)  Wt 157 lb (71.2 kg)  SpO2 100%  BMI 26.13 kg/m2  Body mass index is 26.13 kg/(m^2).  GENERAL: alert and no distress  HENT: ear canals and TM's normal, nose and mouth without ulcers or lesions  NECK: no adenopathy, no asymmetry, masses, or scars and thyroid normal to palpation  discomfort is above thyroid   RESP: lungs clear to auscultation - no rales, rhonchi or wheezes  CV: regular rate and rhythm  MS: no gross musculoskeletal defects noted, no edema  NEURO: Normal strength and tone, mentation intact and speech normal  PSYCH: mentation appears normal, affect normal/bright    Diagnostic Test Results:  Labs     ASSESSMENT/PLAN:       1. Throat discomfort  description sounds like reflux   We are going to treat and see if improves and if does not would do endoscopy   We will also do thyroid testing to make sure thyroid not off   - Strep, Rapid Screen  - TSH  - T4 free  - Thyroid peroxidase antibody  - Anti thyroglobulin antibody  - ranitidine (ZANTAC) 150 MG tablet; Take 1 tablet (150 mg) by mouth 2 times daily  Dispense: 60 tablet; Refill: 1  - Beta strep group A culture    2. Family history of thyroid disease    - TSH  - T4 free  - Thyroid peroxidase antibody  - Anti thyroglobulin antibody    3. Gastroesophageal reflux disease with esophagitis    - ranitidine (ZANTAC) 150 MG tablet; Take 1 tablet (150 mg) by mouth 2 times daily  Dispense: 60 tablet; Refill: 1    Patient Instructions   Lab  In [address]    Avoiding chocolate, coffee, peppermint, fruit juices, tomatoes,NSAID's, greasy and spicy foods. Encouraged moderation of alcoholic " beverages    Ranitidine twice daily for 2-3 weeks     If symptoms improve you can use ranitidine as needed     If symptoms do not improve would consider an endoscopy          JULISSA Herrera CNP  [facility]

## 2018-11-15 NOTE — LETTER
November 16, 2018      Avril Piper  [address] [facility] [address]        Dear ,    We are writing to inform you of your test results.    Normal Thyroid    Resulted Orders   Strep, Rapid Screen   Result Value Ref Range    Specimen Description Throat     Rapid Strep A Screen       NEGATIVE: No Group A streptococcal antigen detected by immunoassay, await culture report.   TSH   Result Value Ref Range    TSH 1.78 0.40 - 4.00 mU/L   T4 free   Result Value Ref Range    T4 Free 1.23 0.76 - 1.46 ng/dL   Thyroid peroxidase antibody   Result Value Ref Range    Thyroid Peroxidase Antibody 11 <35 IU/mL   Anti thyroglobulin antibody   Result Value Ref Range    Thyroglobulin Antibody <20 <40 IU/mL   Beta strep group A culture   Result Value Ref Range    Specimen Description Throat     Culture Micro Questionable specimen        If you have any questions or concerns, please call the clinic at the number listed above.       Sincerely,        JULISSA Herrera CNP

## 2018-11-15 NOTE — PATIENT INSTRUCTIONS
Lab  [address]    Avoiding chocolate, coffee, peppermint, fruit juices, tomatoes,NSAID's, greasy and spicy foods. Encouraged moderation of alcoholic beverages    Ranitidine twice daily for 2-3 weeks     If symptoms improve you can use ranitidine as needed     If symptoms do not improve would consider an endoscopy

## 2018-11-16 LAB
BACTERIA SPEC CULT: NORMAL
SPECIMEN SOURCE: NORMAL
T4 FREE SERPL-MCNC: 1.23 NG/DL (ref 0.76–1.46)
THYROGLOB AB SERPL IA-ACNC: <20 IU/ML (ref 0–40)
THYROPEROXIDASE AB SERPL-ACNC: 11 IU/ML
TSH SERPL DL<=0.005 MIU/L-ACNC: 1.78 MU/L (ref 0.4–4)

## 2018-11-16 ASSESSMENT — PATIENT HEALTH QUESTIONNAIRE - PHQ9: SUM OF ALL RESPONSES TO PHQ QUESTIONS 1-9: 10

## 2019-10-31 ENCOUNTER — TELEPHONE (OUTPATIENT)
Dept: INTERNAL MEDICINE | Facility: CLINIC | Age: 44
End: 2019-10-31

## 2019-10-31 NOTE — TELEPHONE ENCOUNTER
Panel Management Review      Patient has the following on her problem list:     Depression / Dysthymia review    Measure:  Needs PHQ-9 score of 4 or less during index window.  Administer PHQ-9 and if score is 5 or more, send encounter to provider for next steps.    5 - 7 month window range:     PHQ-9 SCORE 4/22/2015 1/25/2017 11/15/2018   PHQ-9 Total Score 13 - -   PHQ-9 Total Score MyChart - - 10 (Moderate depression)   PHQ-9 Total Score - 9 10       If PHQ-9 recheck is 5 or more, route to provider for next steps.    Patient is due for:  PHQ9 and DAP      Composite cancer screening  Chart review shows that this patient is due/due soon for the following Pap Smear  Summary:    Patient is due/failing the following:   DAP, PAP, PHQ9 and PHYSICAL    Action needed:   Patient needs office visit for see above.    Type of outreach:    Phone, left message for patient to call back.     Questions for provider review:    None                                                                                                                                    .NED CASTRO LPN       Chart routed to none .

## 2019-12-11 ENCOUNTER — OFFICE VISIT (OUTPATIENT)
Dept: INTERNAL MEDICINE | Facility: CLINIC | Age: 44
End: 2019-12-11
Payer: COMMERCIAL

## 2019-12-11 VITALS
HEART RATE: 88 BPM | RESPIRATION RATE: 22 BRPM | TEMPERATURE: 98.4 F | BODY MASS INDEX: 26.98 KG/M2 | HEIGHT: 66 IN | OXYGEN SATURATION: 99 % | WEIGHT: 167.9 LBS | DIASTOLIC BLOOD PRESSURE: 72 MMHG | SYSTOLIC BLOOD PRESSURE: 110 MMHG

## 2019-12-11 DIAGNOSIS — M54.9 RIGHT-SIDED BACK PAIN, UNSPECIFIED BACK LOCATION, UNSPECIFIED CHRONICITY: Primary | ICD-10-CM

## 2019-12-11 LAB
ALBUMIN UR-MCNC: NEGATIVE MG/DL
APPEARANCE UR: CLEAR
BACTERIA #/AREA URNS HPF: ABNORMAL /HPF
BILIRUB UR QL STRIP: NEGATIVE
COLOR UR AUTO: YELLOW
GLUCOSE UR STRIP-MCNC: NEGATIVE MG/DL
HGB UR QL STRIP: ABNORMAL
KETONES UR STRIP-MCNC: NEGATIVE MG/DL
LEUKOCYTE ESTERASE UR QL STRIP: NEGATIVE
MUCOUS THREADS #/AREA URNS LPF: PRESENT /LPF
NITRATE UR QL: NEGATIVE
NON-SQ EPI CELLS #/AREA URNS LPF: ABNORMAL /LPF
PH UR STRIP: 7.5 PH (ref 5–7)
RBC #/AREA URNS AUTO: ABNORMAL /HPF
SOURCE: ABNORMAL
SP GR UR STRIP: 1.02 (ref 1–1.03)
UROBILINOGEN UR STRIP-ACNC: 0.2 EU/DL (ref 0.2–1)
WBC #/AREA URNS AUTO: ABNORMAL /HPF

## 2019-12-11 PROCEDURE — 99214 OFFICE O/P EST MOD 30 MIN: CPT | Performed by: INTERNAL MEDICINE

## 2019-12-11 PROCEDURE — 81001 URINALYSIS AUTO W/SCOPE: CPT | Performed by: INTERNAL MEDICINE

## 2019-12-11 RX ORDER — CYCLOBENZAPRINE HCL 10 MG
10 TABLET ORAL 2 TIMES DAILY PRN
Qty: 20 TABLET | Refills: 0 | Status: SHIPPED | OUTPATIENT
Start: 2019-12-11 | End: 2020-02-20

## 2019-12-11 RX ORDER — DEXTROAMPHETAMINE SACCHARATE, AMPHETAMINE ASPARTATE, DEXTROAMPHETAMINE SULFATE AND AMPHETAMINE SULFATE 2.5; 2.5; 2.5; 2.5 MG/1; MG/1; MG/1; MG/1
10 TABLET ORAL DAILY
Qty: 30 TABLET | Refills: 0 | Status: CANCELLED | OUTPATIENT
Start: 2019-12-11

## 2019-12-11 ASSESSMENT — MIFFLIN-ST. JEOR: SCORE: 1438.34
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
## 2020-01-08 DIAGNOSIS — F90.0 ATTENTION DEFICIT HYPERACTIVITY DISORDER (ADHD), PREDOMINANTLY INATTENTIVE TYPE: ICD-10-CM

## 2020-01-08 RX ORDER — DEXTROAMPHETAMINE SACCHARATE, AMPHETAMINE ASPARTATE, DEXTROAMPHETAMINE SULFATE AND AMPHETAMINE SULFATE 2.5; 2.5; 2.5; 2.5 MG/1; MG/1; MG/1; MG/1
10 TABLET ORAL DAILY
Qty: 30 TABLET | Refills: 0 | Status: CANCELLED | OUTPATIENT
Start: 2020-01-08

## 2020-01-08 NOTE — TELEPHONE ENCOUNTER
ADDERALL      Last Written Prescription Date:  02/26/18  Last Fill Quantity: 30,   # refills: 0  Last Office Visit: 12/11/19  Future Office visit:       Routing refill request to provider for review/approval because:  Drug not on the [facility], [facility] or [facility] refill protocol or controlled substance

## 2020-01-09 NOTE — TELEPHONE ENCOUNTER
Not sure why she has not filled for a year?  Dr Jon may have to decide if she wants to do this but can we find out why she has not taken for a year?

## 2020-01-09 NOTE — TELEPHONE ENCOUNTER
Controlled Substance Refill Request for Adderall 10mg  Problem List Complete:  No     PROVIDER TO CONSIDER COMPLETION OF PROBLEM LIST AND OVERVIEW/CONTROLLED SUBSTANCE AGREEMENT    Last Written Prescription Date:  2-26-18  Last Fill Quantity: 30,   # refills: 0    THE MOST RECENT OFFICE VISIT MUST BE WITHIN THE PAST 3 MONTHS. AT LEAST ONE FACE TO FACE VISIT MUST OCCUR EVERY 6 MONTHS. ADDITIONAL VISITS CAN BE VIRTUAL.  (THIS STATEMENT SHOULD BE DELETED.)    Last Office Visit with [facility] primary care provider: 10-30-17    Future Office visit:     Controlled substance agreement:   Encounter-Level CSA:    There are no encounter-level csa.     Patient-Level CSA:    There are no patient-level csa.         Last Urine Drug Screen: No results found for: CDAUT, No results found for: COMDAT, No results found for: THC13, PCP13, COC13, MAMP13, OPI13, AMP13, BZO13, TCA13, MTD13, BAR13, OXY13, PPX13, BUP13     Processing:  Rx to be electronically transmitted to pharmacy by provider      [email or web address]       checked in past 3 months?  Yes 1-9-20  No prescription found with in the last year.    Need appointment prior to refill?    Please advise, thanks.

## 2020-01-09 NOTE — TELEPHONE ENCOUNTER
"Spoke with patient.  States she uses Adderall as needed--gets the \"winter blues and has difficulty focusing\".    Recommended patient schedule an appointment d/t its been over 2 yrs since seen by primary care provider.    Future appointment scheduled.    Next 5 appointments (look out 90 days)    Jan 28, 2020  9:00 AM CST  Office Visit with Denise Jon MD  [facility] [address]  [facility] [address] [phone]          "

## 2020-02-20 ENCOUNTER — OFFICE VISIT (OUTPATIENT)
Dept: BEHAVIORAL HEALTH | Facility: CLINIC | Age: 45
End: 2020-02-20
Payer: COMMERCIAL

## 2020-02-20 ENCOUNTER — OFFICE VISIT (OUTPATIENT)
Dept: INTERNAL MEDICINE | Facility: CLINIC | Age: 45
End: 2020-02-20
Payer: COMMERCIAL

## 2020-02-20 VITALS
TEMPERATURE: 98.8 F | BODY MASS INDEX: 27.48 KG/M2 | DIASTOLIC BLOOD PRESSURE: 80 MMHG | OXYGEN SATURATION: 100 % | WEIGHT: 171 LBS | RESPIRATION RATE: 16 BRPM | SYSTOLIC BLOOD PRESSURE: 110 MMHG | HEART RATE: 80 BPM | HEIGHT: 66 IN

## 2020-02-20 DIAGNOSIS — R69 DIAGNOSIS DEFERRED: Primary | ICD-10-CM

## 2020-02-20 DIAGNOSIS — F10.10 ETOH ABUSE: Primary | ICD-10-CM

## 2020-02-20 DIAGNOSIS — F90.0 ATTENTION DEFICIT HYPERACTIVITY DISORDER (ADHD), PREDOMINANTLY INATTENTIVE TYPE: ICD-10-CM

## 2020-02-20 PROCEDURE — 99214 OFFICE O/P EST MOD 30 MIN: CPT | Performed by: INTERNAL MEDICINE

## 2020-02-20 ASSESSMENT — ANXIETY QUESTIONNAIRES
2. NOT BEING ABLE TO STOP OR CONTROL WORRYING: NOT AT ALL
1. FEELING NERVOUS, ANXIOUS, OR ON EDGE: NOT AT ALL
5. BEING SO RESTLESS THAT IT IS HARD TO SIT STILL: NOT AT ALL
6. BECOMING EASILY ANNOYED OR IRRITABLE: NOT AT ALL
GAD7 TOTAL SCORE: 0
7. FEELING AFRAID AS IF SOMETHING AWFUL MIGHT HAPPEN: NOT AT ALL
3. WORRYING TOO MUCH ABOUT DIFFERENT THINGS: NOT AT ALL

## 2020-02-20 ASSESSMENT — PATIENT HEALTH QUESTIONNAIRE - PHQ9
5. POOR APPETITE OR OVEREATING: NOT AT ALL
SUM OF ALL RESPONSES TO PHQ QUESTIONS 1-9: 0

## 2020-02-20 ASSESSMENT — MIFFLIN-ST. JEOR: SCORE: 1452.4

## 2020-02-20 NOTE — PATIENT INSTRUCTIONS
Donated Cars and Repair   AT  getting there  [phone] The program known as  Getting There  helps address a local need for both affordable and reliable personal transportation, including cars and trucks. The agency will sell donated vehicles to participants at a pre-established price with the assistance of a low-interest loan. The Getting There program is also available to income qualified families living in other regions, including [facility] and [facility] who are in need of reliable transportation. Persons in need of a vehicle should contact the [facility] Agency at [phone] if living in [place] or [facility] or [phone] if living in [facility].   Amber[employer].  [phone] [place] donated and refurbished cars at a lower cost. Required to take a training course to learn basics about car maintenance  [address] [place] [address]   Auto Lease Program [phone] (run by [facility] Work Force [place]), [address] [place] [address], [address]   Car Clinic  http://www.thecarclinic.org/    The Car Clinic is a non-profit ministry in [facility] that provides minor vehicle repair and maintenance to single parents, seniors, veterans, the homeless, or persons in financial need.     The car clinic is held once per month, typically on a Saturday.  The Car Clinic Labor is free, as well as oil and other materials needed for an oil change. If replacement parts are needed for repairs, you will need to pay for them prior to the work being done.  We will try to give you an estimate on how much the part will cost. (We can get a discount rate at the local auto-parts store.)  What if I can't afford to buy parts?  If you don't think you can afford the parts, we will discuss your alternatives and/or direct you to someone who can further discuss your alternatives and/or direct you to an organization that may be able to financially assist you.   Talenz [phone] Cars  Sara Cars is a national sara organization. It provides free vehicles to a wide array of qualified low income clients and families. Example of beneficiaries include families transitioning from public assistance to a new job or training, Victims of domestic violence, the medically needy, Individuals living in transitional housing units or shelter, veterans, the working poor, and  members and their families.   Community Action Partnership of [facility] [phone] [address] [place] [address]. [address]  May be able to offer car repair cost assistance with qualifications. Qualifications include, but may not be limited to: No SUV/Luxury vehicles. Title of the car must be in client's name. Car cannot be over 10-years-old. Help also depends on type of vehicle and mileage. The value of the vehicle must be more than the cost of the car repair. There is an $800 cap off on repair assistance. Call for an application and details.   [employer] [facility]  [phone] [facility]  http://[email or web address]   [address] [place] [address], [address], [place] [address]  Helps residents of [facility] (must be 21 years of age) find a car or with affordable car repair  Email: info@Oncolytics BiotechSanford Hillsboro Medical CenterNOTIKs.org   [facility] [phone] (9546) [place] [address]  [place] donated and refurbished vehicles   [facility] Zank [place]  [address] [place] [address]  www.[facility]/support-services/auto-service-and-repair/  Our automotive program works periodically on  live  vehicles for the public. Fill out service request on their website.   [facility] [phone] Car Care. Call for details   [facility] Car Care [phone] [address] [place] [address]  Oriental orthodox members only, simple car repairs   [facility]  [phone]  Fax: [phone] www.[facility].org  Assists residents [facility]  [facility]. Must have lived in [place] for 30 days or more (Zip codes: [address], [address], [address], [address], [address], [address], [address], [address], [address], [address])  Provides Financial Assistance that can be used for Car Repairs, Furniture Delivery or storage fees, Other emergency needs, Rent and Deposit, mortgage, property tax assistance, utility payments and deposits, and work uniforms  Documents required:  - Application, proof of address ('s license, mail with current address, picture ID, state ID), Section 8 documentation if applicable, utility bills.  - Documentation of why rent assistance is needed such as an eviction notice, car repair receipts, Social Security letter if benefits were stopped, [place] [facility] [place] Sanction or Denial letter, hotel receipts.  - Proof of income from last 60 days, and proof of future income sufficient to pay for next month's rent, utilities and basic needs  - Proof of usage of [facility] [place] Emergency Assistance in last 12 months or a denial letter from [facility] Car Care [phone] Car epifanio repair or car repair with Guidelines. Call for Details   [employer] [phone] http://TraitWare.org  Customers can be referred through their , or by providing income verification for repairs on their vehicle.  [address], [place] [address]  Hours: Wednesday-Saturday from 9-5  Email: info@[email or web address].org    [facility] NetMinder Wheels for Women program [phone] www.[facility].org  [address] [place] [address]  Transportation Assistance program at nonprofit automotive technical school.  Vehicles are provides at no cost to promising single working moms to help them transition off welfare. See criteria listed below or on website:    You are a resident of the [facility] area ([facility], [place], [place], [place], [place] and [facility]) and have lived in this area for at least one year.    You are a single female  head-of-household with one or more dependent children (under 18 years) living with you.    Your school-age children are enrolled in school.    You are currently working full time and have been working full time for at least 6 months.    You have a valid [facility] 's license.    The [facility] requires proof of auto insurance before the ownership of a vehicle can be transferred to a new owner.      Your current annual income doesn't exceed $35,000.    You do not own a vehicle or do not have a vehicle available to your household.    You're able to provide information for verification when requested to do so.     [facility] [phone] [address] [facility] [address], [place] [address]   [facility] Christians in [facility] [phone] Help with car repairs, car epifanio program, clothing distribution and food shelf for those in need of assistance. Serving zip codes [address], [address], [address], [address], [address], [address], [address], and [address].   [facility] [phone] [address] [facility] [address], [place] [address]  www.[facility].[employer]  Help to provide reliable transportation for finding a job or for medical reasons         Employment Resources    [employer] Employment [employer] [phone] https://Tracksmithmn.org/services/employment-services/  Locations: [facility], [facility], [facility], and [place]  At [employer], individuals find customized employment services to achieve their life and career goals. Our career counselors create plans that are unique to each individual - a holistic approach for unemployed and under-employed men and women who are struggling to find work or start their careers. Our employment services are free or no-cost to participants due to eligibility requirements. Contact us to discover career or employment services that are just right for you!   [facility]    [phone]  [place]      [phone]  [facility]      [phone]  [facility] WorkForce Center  [address]  [place] [address]    [place] [facility]  [address]  [place] [address]    [place] WorkForce Center  [address] [place] [address]   Diversionary Work Program Online at Apply[place].[facility].mn.gov.    On paper using the Combined Application Form DHS-5223 (PDF). Mail or bring the completed form to your county or [facility] office (PDF). [place] Department of Human Services (https://[place].gov/dhs/people-we-serve/children-and-families/economic-assistance/income/programs-and-services/diversionary-work-program.jsp)  Program for families with children or pregnant women. Most people are on this program for four months. To Qualify, you must meet the: Income requirement and asset limit of $10,000    The goal is to help parents quickly find work so that they do not need to go on the [place] Family Investment Program (MFIP).   [facility] [phone]  For orientation registration Help on Mondays at 9:30 am.  1 hour orientation to programs offered for job seekers  http://www.[facility].org/OurServices/HelpforJobSeekers.aspx   Hired [phone]  Fax: [phone] http://www.The Medical Centered.org/jobs/  HIRED offers dozens of no-cost programs for job seekers in any stage of work experience or career development. Job seekers could qualify for a program based on geography, income level, age or other circumstance. Other job seekers are referred to HIRED by other agencies. No matter what the entry point, HIRED counselors commit to wrap-around job support to move the job seeker to employment  Locations: [facility], [place], [place], [facility]  Brianne Mauro  [phone] ([place]) to their Career Connections 9-11:30am First Saturday of the month   [place] jobs for individuals with disabilities   https://[place].gov/deed/job-seekers/disabilities/     [place] Vocational Rehabilitation Services  https://mn.[facility].org/mn/programs/job_planning/work_support/luputgd4a.htm  Under the Ticket to Work Program (https://mn.[facility].org/mn/programs/job_planning/work_support/program2.htm)  Adults aged 18 - 64 who get SSI or SSDI due to a disability are automatically eligible for Vocational Rehabilitation services.  Provides career counseling, training, and assistance with the job search. If receiving public assistance, programs may be free of charge.    Apply for Vocational Rehabilitation at any of the following [place] CareerForce Centers:    [place]: [address], [place] [address]    [place]: [address] [facility] [address], [place] [address]    [place]: [address] [place] [address], [place] [address]    [place]: [address] [place] [address]    [place]: [address] [place] [address]    [place]: [address] [place] [address]    [facility]: [address] [place] [address]    [facility]: [address] [facility], [place] [address]    [place]: [address], [address] [address]   People Reaching out to People [phone] [address] [place] [address]  Can assist with finding employment, revising resume or cover letter, completing job applications. Call to schedule appointment and learn information.   Prism [phone] Serves residents of [address] [address]  Food Shelf, clothing and household items, financial assistance, job club         Affordable Housing/ Subsidized Search Resources    ** some of these resources include affordable, subsidized, section 8, and voucher based resources combined in area. Please be sure to look for the type of housing that fits your needs and  situation.     1. Housing Link - https://www.Kids Calendarlink.org/ - 349.468.4756  ? Great resource to find affordable and subsidized apartments. Make sure to specify if you are looking for subsidized in the search area settings.  2. Affordable Housing -  https://SuperMama/  ? Another search engine. Lists subsidized apartments   3. Common Bond Communities- http://properties.commonStrategic Science & Technologies.org/default.aspx   ? Search for affordable housing options. Make sure to click if you are a family, disabled, senior for best results  4. Life Style - [phone] http://www.lifestyleinc.net/vacancy.asp   ? List of subsidized apartments. , updated regularly, mostly rural areas  5. Trovit - https://homes.WakeMate/  6. Section 8 Apartments.org - Full Section info and Application (https://luojgce-4-rflrrxkzxs.org)  7. For Rent By Owner - EMOSpeech  8. Craigslist - craigslist.org  9. Pad  - padmapper.org  10. RoomMattermark.InSync Software      Housing Choice Voucher/Public Housing/Housing Assistance Programs  Housing Waiting Lists throughout the [facility]    - Cyprotexlink.org -> Subsidized Housing drop-down at top of page -> Housing Authority Wait List OR follow this link: https://Kids Calendarlink.org/SubsidizedHousing/HousingAuthorityWaitingList     - Sign up for the applicable open waiting lists for Public Housing, Section 8, Workforce Housing, and Senior Housing    You may check the status of the Housing Choice Voucher waiting list through [facility] by logging into account: http://OpenChimeing.[facility].org/  [facility] CDA:   - [facility] will not disclose status of where a patient is on the waitlist  - Mailing Address changes need to be completed by patient online or by mailing in a paper form      Food Shelves - By [facility] Food Shelves   [facility]) [phone] [address] [place] [address]    A local food shelf serving Daria Garner [place],  [place], and [place]    http://www.Chumen Wenwenhelf.com/   [place] Community Food Shelf [phone] [facility] Building, Lower Level, Back Entrance: [address] [place] [address]    A local food shelf serving [facility], [place], [place], [place], and [place]    http://www.centTuba City Regional Health Care Corporationfoodshelf.org/   Community Emergency Assistance Program (CEAP) [phone] [employer] [address] [facility], [address] [address]    A local food shelf serving [facility]    https://www.ceap.org/   Hunger Solutions [place] 0-136-219-4848 [place] Food Helpline: [address] [place] [address], [address], [place] [address]    A comprehensive hunger relief organization that works to end hunger in [place]    http://www.hungersolutions.org/2016/09/15/[place]-food-helpline-webinar/   [facility] Emergency [facility]. (NACE) [phone] [facility] [address], [place] [address]    A local food shelf serving [facility]    http://www.nacoodshelf.org/   [facility] Community Assistance (SACA) [phone] [address], [place] [address]    A local food shelf serving the community in [facility] and [place]    http://www.sacafoodshelf.org/   [facility] Food Shelves   [facility] Basket [phone] [address] [place] [address]  http://bountifulbasketfoodshelf.org/   [facility] Food Shelf [phone] [address] [facility], [place] [address]  https://www.foodpantries.org/li/kwqhomovm-sstobk-ybyakg-food-shelf   [facility] Food Shelf [phone] [address] [place] [address]  https://www.waconiafoodshelf.org/   [place] Food Shelf [phone] [address] [place], [place]  Food Shelf - By Appointment only   [facility] Food Shelves   [address] [place] [address] FOOD PANTRY [phone] [address]  [place] [address], [address], [place] [id number]  Serves [facility], [place], [place], [place] and [place].    Open Monday through Friday, 9am-4:30pm   Additional food pantries operate in [facility]. Call [phone] The centers may offer groceries, hot meals, and special seasonal programs such as summer snacks for students or free [place] and Thanksgiving meals   [facility]  [phone] [address] [place] [id number]  Food Pantry - Call to confirm hours prior to going   [employer] [phone] [address] [place] [address] [address], [place] [address]  https://www.Ecopol/  We offer high quality, gently used clothing, furniture and household goods at very reasonable prices.    Three times a week we host a food line where the community can receive free fresh produce, bread, and grocery items.   [facility] [phone] Serves residents of [facility], [place], [place], [place], [place], and [facility]  [address], [place] [address]  Food Shelf and Nutrition Assistance program for seniors   Community Action [place]  Food pantry address [phone] [facility] [place] [address], [address] [place], [address]  This is the local [facility] community action agency. A number of programs provide short term assistance, including food and hot meals. At the same time the agency will help people become self-sufficient. Other services include cindy LOPEZ, section 8 vouchers, applications to baby formula from [facility] & [place] Resource Centers [phone] [address] [place], [place], [address]  This location operates a food shelf and other social service programs. Assistance offered is extensive, and includes personal hygiene supplies, household products, laundry and household . Or speak to a  and apply for other resources such as food stamps, TANF welfare, or more.  A second location of  the [facility] is located at [address] [place], [place], [address]. They offer many of the same programs and resources as indicated above.   [facility]  [phone] Serves families in [facility], [facility]. Programs can provide food, meals, and other non-financial support.   [place] Sponto Shelf [phone] [address] [place], [place], [address]  Open Mondays, 12pm-6pm and Thursdays, 3pm-6pm  *By Appointment Only   [facility] [phone] [address] [place], [place], [address]  The non-profit offers Free Food for seniors and other income qualified elderly and residents. The main resource they offer from this facility is the Nutrition Assistance for Seniors (NAPS) supplemental food program. Government commodities, vouchers, and other food assistance is provided.   Feed My Sheep Food Shelf - [facility] [phone] Ext: 11 [address] [place], [place], [address]  The sara has hot meals for very low income families. A pantry is also on site, and they run the Meals on Wheels service for senior citizens.   [facility] [phone] [address] [place] [address], [address]  The food bank at this facility can provide emergency food for [facility] individuals and families in need of short term assistance.   [place], Mother of the Christianity [phone] [address] [place], [place], [address]  Offers groceries, perishable items, emergency food aid, and more. Other referrals can be made for shelters, clothing closets, and holiday programs.   [facility] Sponto Shelf [phone] ext [address] [facility]  [address] [place] [address]  Open Tuesday and Thursday, 12pm-6pm    *By Appointment Only   Our Daily Bread Food Shelf - [place] [facility] [phone] [address] [place], [place], [address]  This Muslim can help ensure that people receive free food  and other household items. This can also include resources to help people and families who are on their journey providing for their family on their own over time.   [facility] [phone] [address], [address]  The non-profit pantry has fruits, paper goods, hygiene items, canned meats, rice, and more. Clients include the disabled, children, and unemployed families.   [facility] Resource [place] [phone] [address], [place], [place], [address]  Open Monday-Thursday 8am-4pm. Additional hours on Tuesday Evenings from 5-7pm    Partners with local charities and offers the [address] [place] [address], formerly Community Action [place] Food Shelves   [place] of the [facility] in [facility] Call [address] [place] [address] to make appointment  [phone] [address] [facility], [place], [place], [address]  Open Tuesdays and Thursday, 12:20pm - 6pm by appointment only  The main service they offer is Our Daily Bread food shelf. They also run several federal government USDA supported programs in [facility]. This is run in partnership with [address] [place] [address] and other local social service agencies.   [facility] [phone] [address] [place], [address]  Call this center for information on and potential access to emergency food and groceries.   The Open Door [phone] [address] [place], [place], [address] [address] [place], [place] [address]  There are two sites of the [facility] food shelf. They offer holiday food baskets, applications to school meals or summer snacks for kids, and more. Phone   [facility] Food Shelves   [phone] Serves residents of [facility], and [facility]  [address] [place] [address]  Food shelf, clothing, transportation, financial, and food assistance   CROSS [phone] Serves residents of [facility]  [place], [place], [place], and [place]  [address] [place] [address]  Food Shelf, clothing and household supplies, food assistance   Good in the [place] [phone] [address] [address]  Offers backpacks for homeless youth, food programs, book program, Holiday Help   [facility] Outreach and Community Partners [phone] Serves residents of [facility], [facility], [place], [facility], and [place]  [address] [place] [address] [address] [place] [address]  Housing Services, Employment Services, Food Shelf, Transportation Program, Clothing   Prism [phone] Serves residents of [place], [facility], [facility]  [address] [place] [address]  Food Shelf, clothing and household items, financial assistance, job club   [facility] [phone] [address], [place] [address]  Food shelf, free clothing and furniture, back to school supplies, holiday support   STEP Food Shelf [phone] Serves residents of [address] [address]  Food Shelf, clothes closet, crisis support, transportation, emergency financial assistance   [facility] Food Shelves   [facility] [phone] [address] [place] [address]  Clothing closet, food shelf, emergency, referral, and other support services   [employer] food shelf [phone] [address] [place], [place], [address]  Free or low cost food is provided to individuals and families in need of short-term assistance. The pantry at the center relies on the generous support and donations from companies, people, churches, non-profit organizations, along with thousands of volunteer hours.  www.[employer].org  Serves residents of [place], [place], [place], [place] and [place].   [facility] [employer]      (-5315 provides  hunger relief and food assistance to low income and unemployed people and individuals who are in need by distributing donated grocery products to human service agencies and charities in the agency.   [employer] provides [phone] free evening meals and groceries at seven dining locations throughout [facility], including [place].   [facility] Food Shelves   [facility] Food Shelf  [address] [place] [address]  Email: [email or web address]  ID required   [place] [facility] Shelf [phone] [address] [place] [address]  [email or web address]   [facility] Food Shelf [phone] [address] [place] [address]  [email or web address]   [facility] Food Shelves   [facility] [phone] [address] [place] [address]  Food assistance   [employer] [facility] Food Shelf [phone] [address] [place] [address]  Emergency Food Shelf   [employer] Food Shelf [phone] [address], [place] [address]  Food pantry - requires Photo ID with current address and proof of address such as a current utility bill, rent receipt, phone bill, etc.  Appointment is required
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
## 2020-02-20 NOTE — PROGRESS NOTES
"Subjective     Avril Piper is a 44 year old female who presents to clinic today for the following health issues:    HPI     Follow up ADD.    She wants to get back on ADD medications. Says she gets up in am and has no ambition and it s lasts until about 1 pm. Says her mood is good. Denies being stressed. But says she does tend to be anxious. Does not get exercise, eats a poor diet and gets 8 hrs sleep a night. She is also having what sounds like 1 bottle of wine most nights. She has no tremors in the morning. She thinks she can quit on her own. Has done it before. Has no suicidal ideation passive or active.     BP Readings from Last 3 Encounters:   02/20/20 110/80   12/11/19 110/72   11/15/18 106/72    Wt Readings from Last 3 Encounters:   02/20/20 77.6 kg (171 lb)   12/11/19 76.2 kg (167 lb 14.4 oz)   11/15/18 71.2 kg (157 lb)                  Reviewed and updated as needed this visit by Provider         Review of Systems   ROS COMP: Constitutional, HEENT, cardiovascular, pulmonary, GI, , musculoskeletal, neuro, skin, endocrine and psych systems are negative, except as otherwise noted.      Objective    /80 (BP Location: Right arm, Patient Position: Chair, Cuff Size: Adult Large)   Pulse 80   Temp 98.8  F (37.1  C) (Oral)   Resp 16   Ht 1.676 m (5' 6\")   Wt 77.6 kg (171 lb)   LMP 01/30/2020   SpO2 100%   Breastfeeding No   BMI 27.60 kg/m    Body mass index is 27.6 kg/m .  Physical Exam   GENERAL: healthy, alert and no distress  NECK: no adenopathy, no asymmetry, masses, or scars and thyroid normal to palpation  RESP: lungs clear to auscultation - no rales, rhonchi or wheezes  CV: regular rate and rhythm, normal S1 S2, no S3 or S4, no murmur, click or rub, no peripheral edema and peripheral pulses strong  ABDOMEN: soft, nontender, no hepatosplenomegaly, no masses and bowel sounds normal  MS: no gross musculoskeletal defects noted, no edema  NEURO: Normal strength and tone, mentation intact and " "speech normal  PSYCH: mentation appears normal, affect normal/bright          Assessment & Plan     1. ETOH abuse  As above, explained it makes no sense to put her on ADD medication when her real problem is being hung over the morning. recommended better diet exercise and quit drinking. Follow up in 1 month     2. Attention deficit hyperactivity disorder (ADHD), predominantly inattentive type  as above.        BMI:   Estimated body mass index is 27.6 kg/m  as calculated from the following:    Height as of this encounter: 1.676 m (5' 6\").    Weight as of this encounter: 77.6 kg (171 lb).   Weight management plan: Discussed healthy diet and exercise guidelines      Return in about 4 weeks (around 3/19/2020).    Denise Jon MD  [facility]      "

## 2020-02-21 ASSESSMENT — ANXIETY QUESTIONNAIRES: GAD7 TOTAL SCORE: 0

## 2020-03-09 ENCOUNTER — TELEPHONE (OUTPATIENT)
Dept: INTERNAL MEDICINE | Facility: CLINIC | Age: 45
End: 2020-03-09

## 2020-03-09 NOTE — TELEPHONE ENCOUNTER
Why? For work? For exposure to some one with TB? Travel? Has she had a PPD before? If yes negative or positive result.

## 2020-03-10 ENCOUNTER — ALLIED HEALTH/NURSE VISIT (OUTPATIENT)
Dept: NURSING | Facility: CLINIC | Age: 45
End: 2020-03-10
Payer: COMMERCIAL

## 2020-03-10 ENCOUNTER — ANCILLARY PROCEDURE (OUTPATIENT)
Dept: GENERAL RADIOLOGY | Facility: CLINIC | Age: 45
End: 2020-03-10
Attending: INTERNAL MEDICINE
Payer: COMMERCIAL

## 2020-03-10 DIAGNOSIS — Z11.1 SCREENING EXAMINATION FOR PULMONARY TUBERCULOSIS: ICD-10-CM

## 2020-03-10 DIAGNOSIS — Z11.1 SCREENING EXAMINATION FOR PULMONARY TUBERCULOSIS: Primary | ICD-10-CM

## 2020-03-10 PROCEDURE — 71046 X-RAY EXAM CHEST 2 VIEWS: CPT

## 2020-03-10 PROCEDURE — 99207 ZZC NO CHARGE NURSE ONLY: CPT

## 2020-03-10 NOTE — TELEPHONE ENCOUNTER
Patient has nurse only appointment today at 3:30 pm for [place] placement.     Attempted to call patient, no answer and voice message is full.

## 2020-03-10 NOTE — NURSING NOTE
Here for TB test - has had Positive PPD in the past - per Kiesha Shen chest X-ray is preferred  - Ordered

## 2021-02-23 ENCOUNTER — HOSPITAL ENCOUNTER (EMERGENCY)
Facility: CLINIC | Age: 46
Discharge: HOME | End: 2021-02-23
Attending: EMERGENCY MEDICINE | Admitting: EMERGENCY MEDICINE
Payer: COMMERCIAL

## 2021-02-23 VITALS
DIASTOLIC BLOOD PRESSURE: 74 MMHG | HEART RATE: 92 BPM | SYSTOLIC BLOOD PRESSURE: 113 MMHG | BODY MASS INDEX: 28.46 KG/M2 | RESPIRATION RATE: 20 BRPM | OXYGEN SATURATION: 99 % | TEMPERATURE: 98.2 F | HEIGHT: 65 IN

## 2021-02-23 DIAGNOSIS — K92.2 LOWER GI BLEED: ICD-10-CM

## 2021-02-23 DIAGNOSIS — R06.02 SOB (SHORTNESS OF BREATH): ICD-10-CM

## 2021-02-23 LAB
ALBUMIN SERPL-MCNC: 3.5 G/DL (ref 3.4–5)
ALP SERPL-CCNC: 79 U/L (ref 40–150)
ALT SERPL W P-5'-P-CCNC: 17 U/L (ref 0–50)
ANION GAP SERPL CALCULATED.3IONS-SCNC: 5 MMOL/L (ref 3–14)
AST SERPL W P-5'-P-CCNC: 16 U/L (ref 0–45)
BASOPHILS # BLD AUTO: 0 10E9/L (ref 0–0.2)
BASOPHILS NFR BLD AUTO: 0.4 %
BILIRUB SERPL-MCNC: 0.3 MG/DL (ref 0.2–1.3)
BUN SERPL-MCNC: 16 MG/DL (ref 7–30)
CALCIUM SERPL-MCNC: 9.5 MG/DL (ref 8.5–10.1)
CHLORIDE SERPL-SCNC: 104 MMOL/L (ref 94–109)
CO2 SERPL-SCNC: 29 MMOL/L (ref 20–32)
CREAT SERPL-MCNC: 0.75 MG/DL (ref 0.52–1.04)
DIFFERENTIAL METHOD BLD: NORMAL
EOSINOPHIL # BLD AUTO: 0.1 10E9/L (ref 0–0.7)
EOSINOPHIL NFR BLD AUTO: 1.2 %
ERYTHROCYTE [DISTWIDTH] IN BLOOD BY AUTOMATED COUNT: 12.5 % (ref 10–15)
ETHANOL SERPL-MCNC: <0.01 G/DL
GFR SERPL CREATININE-BSD FRML MDRD: >90 ML/MIN/{1.73_M2}
GLUCOSE SERPL-MCNC: 114 MG/DL (ref 70–99)
HCT VFR BLD AUTO: 40.7 % (ref 35–47)
HGB BLD-MCNC: 13.2 G/DL (ref 11.7–15.7)
IMM GRANULOCYTES # BLD: 0 10E9/L (ref 0–0.4)
IMM GRANULOCYTES NFR BLD: 0.1 %
INR PPP: 1.01 (ref 0.86–1.14)
LIPASE SERPL-CCNC: 91 U/L (ref 73–393)
LYMPHOCYTES # BLD AUTO: 3.1 10E9/L (ref 0.8–5.3)
LYMPHOCYTES NFR BLD AUTO: 41.6 %
MCH RBC QN AUTO: 30.3 PG (ref 26.5–33)
MCHC RBC AUTO-ENTMCNC: 32.4 G/DL (ref 31.5–36.5)
MCV RBC AUTO: 94 FL (ref 78–100)
MONOCYTES # BLD AUTO: 0.5 10E9/L (ref 0–1.3)
MONOCYTES NFR BLD AUTO: 6.9 %
NEUTROPHILS # BLD AUTO: 3.7 10E9/L (ref 1.6–8.3)
NEUTROPHILS NFR BLD AUTO: 49.8 %
NRBC # BLD AUTO: 0 10*3/UL
NRBC BLD AUTO-RTO: 0 /100
PLATELET # BLD AUTO: 403 10E9/L (ref 150–450)
POTASSIUM SERPL-SCNC: 3.1 MMOL/L (ref 3.4–5.3)
PROT SERPL-MCNC: 8.3 G/DL (ref 6.8–8.8)
RBC # BLD AUTO: 4.35 10E12/L (ref 3.8–5.2)
SODIUM SERPL-SCNC: 138 MMOL/L (ref 133–144)
WBC # BLD AUTO: 7.4 10E9/L (ref 4–11)

## 2021-02-23 PROCEDURE — 93005 ELECTROCARDIOGRAM TRACING: CPT

## 2021-02-23 PROCEDURE — 80053 COMPREHEN METABOLIC PANEL: CPT | Performed by: EMERGENCY MEDICINE

## 2021-02-23 PROCEDURE — 85610 PROTHROMBIN TIME: CPT | Performed by: EMERGENCY MEDICINE

## 2021-02-23 PROCEDURE — 258N000003 HC RX IP 258 OP 636: Performed by: EMERGENCY MEDICINE

## 2021-02-23 PROCEDURE — 83690 ASSAY OF LIPASE: CPT | Performed by: EMERGENCY MEDICINE

## 2021-02-23 PROCEDURE — 82077 ASSAY SPEC XCP UR&BREATH IA: CPT | Performed by: EMERGENCY MEDICINE

## 2021-02-23 PROCEDURE — 99284 EMERGENCY DEPT VISIT MOD MDM: CPT | Mod: 25

## 2021-02-23 PROCEDURE — 85025 COMPLETE CBC W/AUTO DIFF WBC: CPT | Performed by: EMERGENCY MEDICINE

## 2021-02-23 PROCEDURE — 96360 HYDRATION IV INFUSION INIT: CPT

## 2021-02-23 RX ORDER — SODIUM CHLORIDE 9 MG/ML
INJECTION, SOLUTION INTRAVENOUS CONTINUOUS
Status: DISCONTINUED | OUTPATIENT
Start: 2021-02-23 | End: 2021-02-23 | Stop reason: HOSPADM

## 2021-02-23 RX ADMIN — SODIUM CHLORIDE 1000 ML: 9 INJECTION, SOLUTION INTRAVENOUS at 19:30

## 2021-02-23 ASSESSMENT — ENCOUNTER SYMPTOMS
SHORTNESS OF BREATH: 1
BLOOD IN STOOL: 1
FATIGUE: 1
COLOR CHANGE: 1

## 2021-02-24 LAB — INTERPRETATION ECG - MUSE: NORMAL

## 2021-02-24 NOTE — DISCHARGE INSTRUCTIONS
Discharge Instructions  Gastrointestinal Bleeding Not Requiring Admission    You have been seen today because of GI (gastrointestinal) bleeding, bleeding somewhere along your digestive tract.  Most common symptoms are blood in the stool or when you have a bowel movement.  The most common causes of minor GI bleeding are ulcers and hemorrhoids.  Other conditions that cause bleeding include abnormal blood vessels in your GI tract, diverticulosis, inflammatory bowel disease, and cancer.  Fortunately, most of the causes of such bleeding are not life-threatening.      It does not appear that your bleeding is serious enough to require admission to the hospital, but it is very important for you to follow up as instructed.    At your follow up, your regular doctor or GI specialist may order further testing such as:  Blood and stool tests.  Endoscopy and/or colonoscopy, where a tube with a camera is used to look at your digestive tract.  Other very specialized tests depending on your symptoms.    Return to the Emergency Department right away if you:  Develop fever with an oral temperature above 101 F.  Vomit blood or something that looks like coffee grounds.  Have a bowel movement that looks like tar or has a large amount of blood in it.  Feel weak, light-headed, or faint.  Have a racing heartbeat.  Have abdominal pain that is new or increasing.  Have new symptoms that worry you.    What can I do to help myself?  Take any acid reducing medication prescribed by your doctor.  Avoid over the counter medications such as aspirin and Advil , Nuprin  (ibuprofen) that can thin your blood or irritate your stomach, making ulcers worse.  If you were given a prescription for medicine here today, be sure to read all of the information (including the package insert) that comes with your prescription.  This will include important information about the medicine, its side effects, and any warnings that you need to know about.  The pharmacist  who fills the prescription can provide more information and answer questions you may have about the medicine.  If you have questions or concerns that the pharmacist cannot address, please call or return to the Emergency Department.     Opioid Medication Information    Pain medications are among the most commonly prescribed medicines, so we are including this information for all our patients. If you did not receive pain medication or get a prescription for pain medicine, you can ignore it.     You may have been given a prescription for an opioid (narcotic) pain medicine and/or have received a pain medicine while here in the Emergency Department. These medicines can make you drowsy or impaired. You must not drive, operate dangerous equipment, or engage in any other dangerous activities while taking these medications. If you drive while taking these medications, you could be arrested for DUI, or driving under the influence. Do not drink any alcohol while you are taking these medications.     Opioid pain medications can cause addiction. If you have a history of chemical dependency of any type, you are at a higher risk of becoming addicted to pain medications.  Only take these prescribed medications to treat your pain when all other options have been tried. Take it for as short a time and as few doses as possible. Store your pain pills in a secure place, as they are frequently stolen and provide a dangerous opportunity for children or visitors in your house to start abusing these powerful medications. We will not replace any lost or stolen medicine.  As soon as your pain is better, you should flush all your remaining medication.     Many prescription pain medications contain Tylenol  (acetaminophen), including Vicodin , Tylenol #3 , Norco , Lortab , and Percocet .  You should not take any extra pills of Tylenol  if you are using these prescription medications or you can get very sick.  Do not ever take more than 3000 mg  of acetaminophen in any 24 hour period.    All opioids tend to cause constipation. Drink plenty of water and eat foods that have a lot of fiber, such as fruits, vegetables, prune juice, apple juice and high fiber cereal.  Take a laxative if you don t move your bowels at least every other day. Miralax , Milk of Magnesia, Colace , or Senna  can be used to keep you regular.      Remember that you can always come back to the Emergency Department if you are not able to see your regular doctor in the amount of time listed above, if you get any new symptoms, or if there is anything that worries you.

## 2021-02-24 NOTE — ED PROVIDER NOTES
"  History   Chief Complaint:  Rectal Bleeding       The history is provided by the patient.      Avril Piper is a 45 year old female with history of anxiety and depression who presents with 2 episodes of bloody stools, last movement around 1500. She reports that her stools appeared looser today and she noticed red tinged water. She denies any blood when wiping. The patient also mentions feeling exteremly fatigued and short of breath. Further, she noticed a random bruise on her arm that is slightly painful. Denies any known Covid exposure or pain. Not on any blood thinners. She admits that she used to drink a fair amount of alcohol.     Review of Systems   Constitutional: Positive for fatigue.   Respiratory: Positive for shortness of breath.    Gastrointestinal: Positive for blood in stool.   Skin: Positive for color change.   All other systems reviewed and are negative.        Allergies:  The patient has no known allergies.     Medications:  Adderall    Past Medical History:    Depression  High risk HPV infection  Anxiety  ADHD  Dyspareunia  Insertion of Mirena    Past Surgical History:    EGD combined  D&C     Family History:    Father: Hypertension, diabetes, liver disease  Mother: Thyroid disease    Social History:  Presents to the ED with 2 children.    Physical Exam     Patient Vitals for the past 24 hrs:   BP Temp Temp src Pulse Resp SpO2 Height   02/23/21 1950 112/73 -- -- 98 -- 100 % --   02/23/21 1945 -- -- -- -- -- 99 % --   02/23/21 1940 -- -- -- 103 -- 98 % --   02/23/21 1930 112/61 -- -- -- -- -- --   02/23/21 1831 131/86 98.2  F (36.8  C) Oral 128 20 100 % 1.651 m (5' 5\")       Physical Exam  General: The patient is alert, in no respiratory distress.    HENT: Mucous membranes moist.    Cardiovascular: Regular rate and rhythm. Good pulses in all four extremities. Normal capillary refill and skin turgor.     Respiratory: Lungs are clear. No nasal flaring. No retractions. No wheezing, no " crackles.    Gastrointestinal: Abdomen soft. No tenderness, no guarding, no rebound. No palpable hernias.     Musculoskeletal: No gross deformity.     Skin: No rashes or petechiae. Discoloration to left upper arm. No blanching.    Neurologic: The patient is alert and oriented x3. GCS 15. No testable cranial nerve deficit. Follows commands with clear and appropriate speech. Gives appropriate answers. Good strength in all extremities. No gross neurologic deficit. Gross sensation intact. Pupils are round and reactive. No meningismus.     Lymphatic: No cervical adenopathy. No lower extremity swelling.    Psychiatric: The patient is non-tearful.    Emergency Department Course     ECG  ECG taken at 1836, ECG read at 1840  Sinus tachycardia. Otherwise normal ECG.   Rate 108 bpm. AZ interval 132 ms. QRS duration 82 ms. QT/QTc 332/444 ms. P-R-T axes 58 60 49.     Laboratory:   CBC: WBC: 7.4, HGB: 13.2, PLT: 403    CMP: Glucose 114 (H), Potassium: 3.1 (L), o/w WNL (Creatinine: 0.75)    Lipase: 91    INR: 1.01    Alcohol ethyl: <0.01    Emergency Department Course:    Reviewed:  I reviewed the patient's nursing notes, vitals, past medical records, Care Everywhere.     Assessments:  1904 Initial examination of the patient.     2000 Updated and rechecked patient.     Interventions:  1930 NS 1L IV    Disposition:  The patient was discharged to home.     Impression & Plan     Medical Decision Making:  Patient report presented today complaining of lightheadedness with 2 bloody stools.  On questioning it does not sound like the stools themselves were black or tarry and she does not use frequent ibuprofen.  I did question about drinking and she said she had done so in the past but not currently.  Her abdominal exam is benign.  I did consider colitis diverticulitis and upper GI bleeds but felt that that is unlikely with her description of the bright red blood in the water on the stool.  She also reported that when she wiped there was  not blood there.  There was report for the family of bruising but I did not visualize that here it looks more like a birthmark and her platelets are intact.  Patient does have hemoglobin of 13 I stressed she should closely follow-up for repeat of her hemoglobin.  I did consider performing a CT scan to look for diverticular bleed but the risk of radiation was too high in someone who is stable and she was discharged home in good condition with no tachycardia and no current lightheadedness.  Her heart rate came down and she was much more comfortable after IV fluid.  Symptoms to return for discussed.        Diagnosis:    ICD-10-CM    1. Lower GI bleed  K92.2    2. SOB (shortness of breath)  R06.02        Scribe Disclosure:  I, Ha Arriola, am serving as a scribe at 7:05 PM on 2/23/2021 to document services personally performed by Matthew Henao MD based on my observations and the provider's statements to me.            Matthew Henao MD  02/23/21 1392

## 2021-02-24 NOTE — ED TRIAGE NOTES
Pt presents for evaluation of bloody stools starting today. Pt has had 2 bloody stools. Denies any abdominal. Has also been feeling short of breath and fatigued x 2 days. Also notes a bruise to the left upper arm, but doesn't;t know how it got there.

## 2021-10-12 ENCOUNTER — VIRTUAL VISIT (OUTPATIENT)
Dept: INTERNAL MEDICINE | Facility: CLINIC | Age: 46
End: 2021-10-12
Payer: COMMERCIAL

## 2021-10-12 DIAGNOSIS — F90.0 ATTENTION DEFICIT HYPERACTIVITY DISORDER (ADHD), PREDOMINANTLY INATTENTIVE TYPE: ICD-10-CM

## 2021-10-12 PROCEDURE — 99213 OFFICE O/P EST LOW 20 MIN: CPT | Mod: 95 | Performed by: INTERNAL MEDICINE

## 2021-10-12 RX ORDER — CHOLECALCIFEROL (VITAMIN D3) 50 MCG
1 TABLET ORAL 2 TIMES DAILY
COMMUNITY
Start: 2021-10-12 | End: 2023-01-24

## 2021-10-12 RX ORDER — DEXTROAMPHETAMINE SACCHARATE, AMPHETAMINE ASPARTATE, DEXTROAMPHETAMINE SULFATE AND AMPHETAMINE SULFATE 2.5; 2.5; 2.5; 2.5 MG/1; MG/1; MG/1; MG/1
10 TABLET ORAL DAILY
Qty: 30 TABLET | Refills: 0 | Status: SHIPPED | OUTPATIENT
Start: 2021-10-12 | End: 2022-04-14 | Stop reason: DRUGHIGH

## 2021-10-12 NOTE — PROGRESS NOTES
Avril is a 46 year old who is being evaluated via a billable video visit.      How would you like to obtain your AVS? Mail a copy  If the video visit is dropped, the invitation should be resent by: Text to cell phone: [phone]  Will anyone else be joining your video visit? No      Video Start Time: 11:53 AM    Assessment & Plan     Attention deficit hyperactivity disorder (ADHD), predominantly inattentive type  Agreed to start her on 10 mg. Follow up in 4 weeks   - amphetamine-dextroamphetamine (ADDERALL) 10 MG tablet; Take 1 tablet (10 mg) by mouth daily             Return in about 6 months (around 4/12/2022).    Denise Jon MD  [facility]   Avril is a 46 year old who presents for the following health issues     HPI     Patient advised due for multiple Health Maintenance issues.    Follow up ADHD.    She has been on adderral for several years. Went off 3 years ago. But say she is not doing well. Her mind is scattered and she has trouble to concentrate. She would like to go back on it. She was on 10 mg. She would like to try a higher dose because she does not think she was on a high enough dose before. She does not recall having any side effects.     Review of Systems   Constitutional, HEENT, cardiovascular, pulmonary, gi and gu systems are negative, except as otherwise noted.      Objective           Vitals:  No vitals were obtained today due to virtual visit.    Physical Exam   GENERAL: Healthy, alert and no distress  EYES: Eyes grossly normal to inspection.  No discharge or erythema, or obvious scleral/conjunctival abnormalities.  RESP: No audible wheeze, cough, or visible cyanosis.  No visible retractions or increased work of breathing.    SKIN: Visible skin clear. No significant rash, abnormal pigmentation or lesions.  NEURO: Cranial nerves grossly intact.  Mentation and speech appropriate for age.  PSYCH: Mentation appears normal, affect normal/bright, judgement  and insight intact, normal speech and appearance well-groomed.          Video-Visit Details    Type of service:  Video Visit    Video End Time:11:59 AM    Originating Location (pt. Location): Home    Distant Location (provider location):  [facility]     Platform used for Video Visit: Rose

## 2021-11-10 ENCOUNTER — TELEPHONE (OUTPATIENT)
Dept: INTERNAL MEDICINE | Facility: CLINIC | Age: 46
End: 2021-11-10
Payer: COMMERCIAL

## 2021-11-10 DIAGNOSIS — F90.0 ATTENTION DEFICIT HYPERACTIVITY DISORDER (ADHD), PREDOMINANTLY INATTENTIVE TYPE: Primary | ICD-10-CM

## 2021-11-10 NOTE — TELEPHONE ENCOUNTER
Pt calling requesting a dosage increase for her Adderall, Pt stated that medication seems to wear off throughout the day. She can be reached at [phone]. Please advise. Thanks.

## 2021-11-10 NOTE — TELEPHONE ENCOUNTER
Please see message below.    Per chart, last virtual visit 10-12-21 and patient taking Adderall 10mg daily.    Please advise, thanks.

## 2021-11-16 RX ORDER — DEXTROAMPHETAMINE SACCHARATE, AMPHETAMINE ASPARTATE, DEXTROAMPHETAMINE SULFATE AND AMPHETAMINE SULFATE 5; 5; 5; 5 MG/1; MG/1; MG/1; MG/1
20 TABLET ORAL 2 TIMES DAILY
Qty: 30 TABLET | Refills: 0 | Status: SHIPPED | OUTPATIENT
Start: 2021-11-16 | End: 2021-12-10

## 2021-11-16 NOTE — TELEPHONE ENCOUNTER
Call to patient. Patient informed of Dr. Jon's response below. Patient verbalized understanding and denies questions/concerns.     Destini SHINE RN   [facility]

## 2021-12-10 DIAGNOSIS — F90.0 ATTENTION DEFICIT HYPERACTIVITY DISORDER (ADHD), PREDOMINANTLY INATTENTIVE TYPE: ICD-10-CM

## 2021-12-10 RX ORDER — DEXTROAMPHETAMINE SACCHARATE, AMPHETAMINE ASPARTATE, DEXTROAMPHETAMINE SULFATE AND AMPHETAMINE SULFATE 5; 5; 5; 5 MG/1; MG/1; MG/1; MG/1
20 TABLET ORAL 2 TIMES DAILY
Qty: 30 TABLET | Refills: 0 | Status: SHIPPED | OUTPATIENT
Start: 2021-12-14 | End: 2022-01-12

## 2021-12-10 NOTE — TELEPHONE ENCOUNTER
amphetamine-dextroamphetamine (ADDERALL) 20 MG tablet      Last Written Prescription Date:  11/16/21  Last Fill Quantity: 30,   # refills: 0  Last Office Visit: 09/29/21  Future Office visit:       Routing refill request to provider for review/approval because:  Drug not on the [facility], [facility] or [facility] refill protocol or controlled substance

## 2022-01-27 ENCOUNTER — OFFICE VISIT (OUTPATIENT)
Dept: PEDIATRICS | Facility: CLINIC | Age: 47
End: 2022-01-27
Payer: COMMERCIAL

## 2022-01-27 VITALS
WEIGHT: 185 LBS | RESPIRATION RATE: 14 BRPM | HEART RATE: 104 BPM | BODY MASS INDEX: 30.82 KG/M2 | OXYGEN SATURATION: 99 % | SYSTOLIC BLOOD PRESSURE: 128 MMHG | DIASTOLIC BLOOD PRESSURE: 70 MMHG | HEIGHT: 65 IN | TEMPERATURE: 98.4 F

## 2022-01-27 DIAGNOSIS — Z12.4 CERVICAL CANCER SCREENING: Primary | ICD-10-CM

## 2022-01-27 PROCEDURE — 99207 PR NO CHARGE LOS: CPT | Performed by: STUDENT IN AN ORGANIZED HEALTH CARE EDUCATION/TRAINING PROGRAM

## 2022-01-27 ASSESSMENT — MIFFLIN-ST. JEOR: SCORE: 1490.03

## 2022-04-11 DIAGNOSIS — F90.0 ATTENTION DEFICIT HYPERACTIVITY DISORDER (ADHD), PREDOMINANTLY INATTENTIVE TYPE: ICD-10-CM

## 2022-04-11 RX ORDER — DEXTROAMPHETAMINE SACCHARATE, AMPHETAMINE ASPARTATE, DEXTROAMPHETAMINE SULFATE AND AMPHETAMINE SULFATE 5; 5; 5; 5 MG/1; MG/1; MG/1; MG/1
TABLET ORAL
Qty: 30 TABLET | Refills: 0 | Status: CANCELLED | OUTPATIENT
Start: 2022-04-11

## 2022-04-11 NOTE — TELEPHONE ENCOUNTER
Routing refill request to provider for review/approval because:  Drug not on the [facility] refill protocol     Routed to covering provider for review

## 2022-04-13 ENCOUNTER — OFFICE VISIT (OUTPATIENT)
Dept: INTERNAL MEDICINE | Facility: CLINIC | Age: 47
End: 2022-04-13
Payer: COMMERCIAL

## 2022-04-13 VITALS
OXYGEN SATURATION: 100 % | TEMPERATURE: 98.8 F | DIASTOLIC BLOOD PRESSURE: 75 MMHG | BODY MASS INDEX: 30.33 KG/M2 | WEIGHT: 188.7 LBS | HEIGHT: 66 IN | SYSTOLIC BLOOD PRESSURE: 108 MMHG | HEART RATE: 110 BPM | RESPIRATION RATE: 24 BRPM

## 2022-04-13 DIAGNOSIS — Z12.4 CERVICAL CANCER SCREENING: ICD-10-CM

## 2022-04-13 DIAGNOSIS — F90.0 ATTENTION DEFICIT HYPERACTIVITY DISORDER (ADHD), PREDOMINANTLY INATTENTIVE TYPE: ICD-10-CM

## 2022-04-13 DIAGNOSIS — Z12.11 SCREEN FOR COLON CANCER: ICD-10-CM

## 2022-04-13 DIAGNOSIS — Z13.220 SCREENING FOR HYPERLIPIDEMIA: ICD-10-CM

## 2022-04-13 DIAGNOSIS — Z00.00 ROUTINE GENERAL MEDICAL EXAMINATION AT A HEALTH CARE FACILITY: Primary | ICD-10-CM

## 2022-04-13 DIAGNOSIS — Z12.31 VISIT FOR SCREENING MAMMOGRAM: ICD-10-CM

## 2022-04-13 LAB
ERYTHROCYTE [DISTWIDTH] IN BLOOD BY AUTOMATED COUNT: 13.8 % (ref 10–15)
HCT VFR BLD AUTO: 39.9 % (ref 35–47)
HGB BLD-MCNC: 13.2 G/DL (ref 11.7–15.7)
MCH RBC QN AUTO: 30.7 PG (ref 26.5–33)
MCHC RBC AUTO-ENTMCNC: 33.1 G/DL (ref 31.5–36.5)
MCV RBC AUTO: 93 FL (ref 78–100)
PLATELET # BLD AUTO: 404 10E3/UL (ref 150–450)
RBC # BLD AUTO: 4.3 10E6/UL (ref 3.8–5.2)
WBC # BLD AUTO: 7.6 10E3/UL (ref 4–11)

## 2022-04-13 PROCEDURE — 99396 PREV VISIT EST AGE 40-64: CPT | Mod: 25 | Performed by: NURSE PRACTITIONER

## 2022-04-13 PROCEDURE — 90471 IMMUNIZATION ADMIN: CPT | Performed by: NURSE PRACTITIONER

## 2022-04-13 PROCEDURE — 80048 BASIC METABOLIC PNL TOTAL CA: CPT | Performed by: NURSE PRACTITIONER

## 2022-04-13 PROCEDURE — 90715 TDAP VACCINE 7 YRS/> IM: CPT | Performed by: NURSE PRACTITIONER

## 2022-04-13 PROCEDURE — 80061 LIPID PANEL: CPT | Performed by: NURSE PRACTITIONER

## 2022-04-13 PROCEDURE — 36415 COLL VENOUS BLD VENIPUNCTURE: CPT | Performed by: NURSE PRACTITIONER

## 2022-04-13 PROCEDURE — 87624 HPV HI-RISK TYP POOLED RSLT: CPT | Performed by: NURSE PRACTITIONER

## 2022-04-13 PROCEDURE — 85027 COMPLETE CBC AUTOMATED: CPT | Performed by: NURSE PRACTITIONER

## 2022-04-13 PROCEDURE — G0145 SCR C/V CYTO,THINLAYER,RESCR: HCPCS | Performed by: NURSE PRACTITIONER

## 2022-04-13 RX ORDER — DEXTROAMPHETAMINE SACCHARATE, AMPHETAMINE ASPARTATE, DEXTROAMPHETAMINE SULFATE AND AMPHETAMINE SULFATE 5; 5; 5; 5 MG/1; MG/1; MG/1; MG/1
20 TABLET ORAL DAILY
Qty: 60 TABLET | Refills: 0 | Status: SHIPPED | OUTPATIENT
Start: 2022-04-13 | End: 2022-05-12

## 2022-04-13 ASSESSMENT — ENCOUNTER SYMPTOMS
FREQUENCY: 0
DIARRHEA: 0
DYSURIA: 0
HEARTBURN: 0
HEMATOCHEZIA: 0
CONSTIPATION: 0
SORE THROAT: 0
JOINT SWELLING: 0
FEVER: 0
BREAST MASS: 0
MYALGIAS: 0
WEAKNESS: 0
CHILLS: 0
SHORTNESS OF BREATH: 0
HEADACHES: 0
DIZZINESS: 0
NAUSEA: 0
ABDOMINAL PAIN: 0
HEMATURIA: 0
PALPITATIONS: 0
EYE PAIN: 0
NERVOUS/ANXIOUS: 0
COUGH: 0
PARESTHESIAS: 0
ARTHRALGIAS: 0

## 2022-04-13 NOTE — NURSING NOTE
Prior to injection, verified patient identity using patient's name and date of birth.  Due to injection administration, patient instructed to remain in clinic for 15 minutes afterwards, and to report any adverse reaction to me or the  staff immediately.    Drug Amount Wasted:  None.  Vial/Syringe: Syringe  Expiration Date:  01/12/2024    Screening Questionnaire for Adult Immunization     Are you sick today?   No    Do you have allergies to medications, food or any vaccine?   No    Have you ever had a serious reaction after receiving a vaccination?   No    Do you have a long-term health problem with heart disease, lung disease,  asthma, kidney disease, diabetes, anemia, metabolic or blood disease?   No    Do you have cancer, leukemia, AIDS, or any immune system problem?   No    Do you take cortisone, prednisone, other steroids, or anticancer drugs, or  have you had any x-ray (radiation) treatments?   No    Have you had a seizure, brain, or other nervous system problem?   No    During the past year, have you received a transfusion of blood or blood       products, or been given a medicine called immune (gamma) globulin?   No    For women: Are you pregnant or is there a chance you could become         pregnant during the next month?   No    Have you received any vaccinations in the past 4 weeks?   No     Immunization questionnaire answers were all negative.      MNVFC doesn't apply on this patient     Screening performed by Kellie Durbin MA on 4/13/2022 at 4:54 PM.

## 2022-04-13 NOTE — PROGRESS NOTES
"   SUBJECTIVE:   CC: Avril Piper is an 47 year old woman who presents for preventive health visit.       Patient has been advised of split billing requirements and indicates understanding: Yes     Healthy Habits:     Getting at least 3 servings of Calcium per day:  Yes    Bi-annual eye exam:  Yes    Dental care twice a year:  NO    Sleep apnea or symptoms of sleep apnea:  None    Diet:  Regular (no restrictions)    Frequency of exercise:  6-7 days/week    Duration of exercise:  Other    Taking medications regularly:  Yes    Medication side effects:  Not applicable    PHQ-2 Total Score: 0    Additional concerns today:  No          Request 2 month supply adderall as she will be visiting family in [place]  Requesting any travel immunizations  Has not had Covid vaccine, requesting letter that she cannot receive it due to \"my health\"  She has no chronic or immunocompromised health issues  Directed to Travel clinic and airline requirements for flight and travel out of country.    Today's PHQ-2 Score:   PHQ-2 ( 1999 Pfizer) 4/13/2022   Q1: Little interest or pleasure in doing things 0   Q2: Feeling down, depressed or hopeless 0   PHQ-2 Score 0   PHQ-2 Total Score (12-17 Years)- Positive if 3 or more points; Administer PHQ-A if positive -   Q1: Little interest or pleasure in doing things Not at all   Q2: Feeling down, depressed or hopeless Not at all   PHQ-2 Score 0       Abuse: Current or Past (Physical, Sexual or Emotional) - No  Do you feel safe in your environment? Yes    Have you ever done Advance Care Planning? (For example, a Health Directive, POLST, or a discussion with a medical provider or your loved ones about your wishes): No, advance care planning information given to patient to review.  Patient declined advance care planning discussion at this time.    Social History     Tobacco Use     Smoking status: Former Smoker     Types: Cigarettes     Smokeless tobacco: Never Used     Tobacco comment: very " occasionally with friends    Substance Use Topics     Alcohol use: No     If you drink alcohol do you typically have >3 drinks per day or >7 drinks per week? Yes      Alcohol Use 4/13/2022   Prescreen: >3 drinks/day or >7 drinks/week? Yes   Prescreen: >3 drinks/day or >7 drinks/week? -   AUDIT SCORE  1     AUDIT - Alcohol Use Disorders Identification Test - Reproduced from the World Health Organization Audit 2001 (Second Edition) 4/13/2022   1.  How often do you have a drink containing alcohol? Monthly or less   2.  How many drinks containing alcohol do you have on a typical day when you are drinking? 1 or 2   3.  How often do you have five or more drinks on one occasion? Never   4.  How often during the last year have you found that you were not able to stop drinking once you had started? Never   5.  How often during the last year have you failed to do what was normally expected of you because of drinking? Never   6.  How often during the last year have you needed a first drink in the morning to get yourself going after a heavy drinking session? Never   7.  How often during the last year have you had a feeling of guilt or remorse after drinking? Never   8.  How often during the last year have you been unable to remember what happened the night before because of your drinking? Never   9.  Have you or someone else been injured because of your drinking? No   10. Has a relative, friend, doctor or other health care worker been concerned about your drinking or suggested you cut down? No   TOTAL SCORE 1       Reviewed orders with patient.  Reviewed health maintenance and updated orders accordingly - Yes  BP Readings from Last 3 Encounters:   04/13/22 108/75   01/27/22 128/70   02/23/21 113/74    Wt Readings from Last 3 Encounters:   04/13/22 85.6 kg (188 lb 11.2 oz)   01/27/22 83.9 kg (185 lb)   02/20/20 77.6 kg (171 lb)                  Patient Active Problem List   Diagnosis     Menstrual irregularity     Head ache      Mild major depression (H)     Insertion of Mirena     CARDIOVASCULAR SCREENING; LDL GOAL LESS THAN 160     Dyspareunia     ADHD (attention deficit hyperactivity disorder)     Anxiety     High risk HPV infection     Past Surgical History:   Procedure Laterality Date     ESOPHAGOSCOPY, GASTROSCOPY, DUODENOSCOPY (EGD), COMBINED  8/10/2012    Procedure: COMBINED ESOPHAGOSCOPY, GASTROSCOPY, DUODENOSCOPY (EGD);  ESOPHAGOSCOPY, GASTROSCOPY, DUODENOSCOPY (EGD)  ;  Surgeon: Tyron Begum MD;  Location: [facility]     HC DILATION/CURETTAGE DIAG/THER NON OB        cysts removed from ovaries       Social History     Tobacco Use     Smoking status: Former Smoker     Types: Cigarettes     Smokeless tobacco: Never Used     Tobacco comment: very occasionally with friends    Substance Use Topics     Alcohol use: No     Family History   Problem Relation Age of Onset     Hypertension Father      Diabetes Father      Liver Disease Father         Hep C     Cancer Maternal Grandmother         Ovarian cancer     Thyroid Disease Mother      Heart Disease Other         aunt, heart attack         Current Outpatient Medications   Medication Sig Dispense Refill     amphetamine-dextroamphetamine (ADDERALL) 20 MG tablet Take 1 tablet (20 mg) by mouth daily 60 tablet 0     Multiple Vitamins-Minerals (MULTIVITAMIN ADULT PO)        vitamin D3 (CHOLECALCIFEROL) 50 mcg (2000 units) tablet Take 1 tablet (50 mcg) by mouth 2 times daily         Breast Cancer Screening:    Breast CA Risk Assessment (FHS-7) 4/13/2022   Do you have a family history of breast, colon, or ovarian cancer? No / Unknown         Mammogram Screening: Recommended annual mammography  Pertinent mammograms are reviewed under the imaging tab.    History of abnormal Pap smear: NO - age 30- 65 PAP every 3 years recommended  PAP / HPV Latest Ref Rng & Units 1/25/2017 4/22/2015 9/2/2010   PAP (Historical) - NIL NIL NIL   HPV16 NEG Negative Negative -   HPV18 NEG Negative Negative -  "  HRHPV NEG Negative Positive(A) -     Reviewed and updated as needed this visit by clinical staff   Tobacco  Allergies    Med Hx  Surg Hx  Fam Hx  Soc Hx          Reviewed and updated as needed this visit by Provider                       Review of Systems   Constitutional: Negative for chills and fever.   HENT: Negative for congestion, ear pain, hearing loss and sore throat.    Eyes: Negative for pain and visual disturbance.   Respiratory: Negative for cough and shortness of breath.    Cardiovascular: Negative for chest pain, palpitations and peripheral edema.   Gastrointestinal: Negative for abdominal pain, constipation, diarrhea, heartburn, hematochezia and nausea.   Breasts:  Negative for tenderness, breast mass and discharge.   Genitourinary: Positive for vaginal bleeding and vaginal discharge. Negative for dysuria, frequency, genital sores, hematuria and urgency.   Musculoskeletal: Negative for arthralgias, joint swelling and myalgias.   Skin: Positive for rash.   Neurological: Negative for dizziness, weakness, headaches and paresthesias.   Psychiatric/Behavioral: Negative for mood changes. The patient is not nervous/anxious.           OBJECTIVE:   /75 (BP Location: Right arm, Patient Position: Sitting, Cuff Size: Adult Large)   Pulse 110   Temp 98.8  F (37.1  C) (Tympanic)   Resp 24   Ht 1.664 m (5' 5.5\")   Wt 85.6 kg (188 lb 11.2 oz)   LMP  (LMP Unknown)   SpO2 100%   BMI 30.92 kg/m    Physical Exam  GENERAL: alert, no distress and obese  EYES: Eyes grossly normal to inspection, PERRL and conjunctivae and sclerae normal  NECK: no adenopathy, no asymmetry, masses, or scars and thyroid normal to palpation  RESP: lungs clear to auscultation - no rales, rhonchi or wheezes  CV: regular rate and rhythm, normal S1 S2, no S3 or S4, no murmur, click or rub, no peripheral edema and peripheral pulses strong  ABDOMEN: soft, nontender, no hepatosplenomegaly, no masses and bowel sounds normal   " "(female): normal female external genitalia, normal urethral meatus  and vaginal mucosa pink, moist, well rugated  MS: no gross musculoskeletal defects noted, no edema  PSYCH: mentation appears normal, affect normal/bright        ASSESSMENT/PLAN:       ICD-10-CM    1. Routine general medical examination at a health care facility  Z00.00 CBC with platelets     Basic metabolic panel  (Ca, Cl, CO2, Creat, Gluc, K, Na, BUN)     CBC with platelets     Basic metabolic panel  (Ca, Cl, CO2, Creat, Gluc, K, Na, BUN)   2. Visit for screening mammogram  Z12.31 MA SCREENING DIGITAL BILAT - Future  (s+30)   3. Screen for colon cancer  Z12.11 Fecal colorectal cancer screen FIT - Future (S+30)     Fecal colorectal cancer screen FIT - Future (S+30)   4. Cervical cancer screening  Z12.4 Pap Screen with HPV - recommended age 30 - 65 years   5. Screening for hyperlipidemia  Z13.220 Lipid panel reflex to direct LDL Non-fasting     Lipid panel reflex to direct LDL Non-fasting   6. Attention deficit hyperactivity disorder (ADHD), predominantly inattentive type  F90.0 amphetamine-dextroamphetamine (ADDERALL) 20 MG tablet           COUNSELING:  Reviewed preventive health counseling, as reflected in patient instructions    Estimated body mass index is 30.92 kg/m  as calculated from the following:    Height as of this encounter: 1.664 m (5' 5.5\").    Weight as of this encounter: 85.6 kg (188 lb 11.2 oz).    Weight management plan: Discussed healthy diet and exercise guidelines    She reports that she has quit smoking. Her smoking use included cigarettes. She has never used smokeless tobacco.      Counseling Resources:  ATP IV Guidelines  Pooled Cohorts Equation Calculator  Breast Cancer Risk Calculator  BRCA-Related Cancer Risk Assessment: FHS-7 Tool  FRAX Risk Assessment  ICSI Preventive Guidelines  Dietary Guidelines for Americans, 2010  USDA's MyPlate  ASA Prophylaxis  Lung CA Screening    Shireen Larry NP  [facility] " [facility]

## 2022-04-14 LAB
ANION GAP SERPL CALCULATED.3IONS-SCNC: 5 MMOL/L (ref 3–14)
BUN SERPL-MCNC: 10 MG/DL (ref 7–30)
CALCIUM SERPL-MCNC: 9.9 MG/DL (ref 8.5–10.1)
CHLORIDE BLD-SCNC: 106 MMOL/L (ref 94–109)
CHOLEST SERPL-MCNC: 209 MG/DL
CO2 SERPL-SCNC: 26 MMOL/L (ref 20–32)
CREAT SERPL-MCNC: 0.74 MG/DL (ref 0.52–1.04)
FASTING STATUS PATIENT QL REPORTED: NO
GFR SERPL CREATININE-BSD FRML MDRD: >90 ML/MIN/1.73M2
GLUCOSE BLD-MCNC: 84 MG/DL (ref 70–99)
HDLC SERPL-MCNC: 86 MG/DL
LDLC SERPL CALC-MCNC: 112 MG/DL
NONHDLC SERPL-MCNC: 123 MG/DL
POTASSIUM BLD-SCNC: 4.5 MMOL/L (ref 3.4–5.3)
SODIUM SERPL-SCNC: 137 MMOL/L (ref 133–144)
TRIGL SERPL-MCNC: 54 MG/DL

## 2022-04-18 LAB
BKR LAB AP GYN ADEQUACY: NORMAL
BKR LAB AP GYN INTERPRETATION: NORMAL
BKR LAB AP HPV REFLEX: NORMAL
BKR LAB AP PREVIOUS ABNORMAL: NORMAL
PATH REPORT.COMMENTS IMP SPEC: NORMAL
PATH REPORT.COMMENTS IMP SPEC: NORMAL
PATH REPORT.RELEVANT HX SPEC: NORMAL

## 2022-04-20 LAB
HUMAN PAPILLOMA VIRUS 16 DNA: NEGATIVE
HUMAN PAPILLOMA VIRUS 18 DNA: NEGATIVE
HUMAN PAPILLOMA VIRUS FINAL DIAGNOSIS: NORMAL
HUMAN PAPILLOMA VIRUS OTHER HR: NEGATIVE

## 2022-04-22 LAB — HEMOCCULT STL QL IA: POSITIVE

## 2022-04-22 PROCEDURE — 82274 ASSAY TEST FOR BLOOD FECAL: CPT | Performed by: NURSE PRACTITIONER

## 2022-04-25 ENCOUNTER — TELEPHONE (OUTPATIENT)
Dept: INTERNAL MEDICINE | Facility: CLINIC | Age: 47
End: 2022-04-25
Payer: COMMERCIAL

## 2022-04-25 DIAGNOSIS — R19.5 POSITIVE FIT (FECAL IMMUNOCHEMICAL TEST): Primary | ICD-10-CM

## 2022-04-25 NOTE — TELEPHONE ENCOUNTER
Patient returned call and was advised Shireen's message.  Patient states she had a negative colonoscopy and she has the papers.  Patient states she will drop off a copy.

## 2022-04-26 ENCOUNTER — HOSPITAL ENCOUNTER (OUTPATIENT)
Facility: CLINIC | Age: 47
End: 2022-04-26
Attending: INTERNAL MEDICINE | Admitting: INTERNAL MEDICINE
Payer: COMMERCIAL

## 2022-04-27 DIAGNOSIS — Z11.59 ENCOUNTER FOR SCREENING FOR OTHER VIRAL DISEASES: Primary | ICD-10-CM

## 2022-05-02 NOTE — TELEPHONE ENCOUNTER
PT is scheduled end of the month for colonoscopy. Will need to confirm when her last Negative colonoscopy was. Insurance may not cover if too soon, within 1-2 years.   Call to pt and left message to bring report in or call back.

## 2022-05-12 DIAGNOSIS — F90.0 ATTENTION DEFICIT HYPERACTIVITY DISORDER (ADHD), PREDOMINANTLY INATTENTIVE TYPE: ICD-10-CM

## 2022-05-12 RX ORDER — DEXTROAMPHETAMINE SACCHARATE, AMPHETAMINE ASPARTATE, DEXTROAMPHETAMINE SULFATE AND AMPHETAMINE SULFATE 5; 5; 5; 5 MG/1; MG/1; MG/1; MG/1
20 TABLET ORAL DAILY
Qty: 30 TABLET | Refills: 0 | Status: SHIPPED | OUTPATIENT
Start: 2022-05-12 | End: 2022-08-12

## 2022-05-12 NOTE — TELEPHONE ENCOUNTER
Reason for Call:  Medication or medication refill:    Do you use a [facility]?  Name of the pharmacy and phone number for the current request:  [facility] [address] [facility] - [phone]    Name of the medication requested: Adderall 20mg    Other request:  Patient is leaving country 5/24 and wants to ensure she has refills available.    Can we leave a detailed message on this number? YES    Phone number patient can be reached at: Cell number on file:    Telephone Information:   Mobile [phone]       Best Time: any time    Call taken on 5/12/2022 at 12:10 PM by Mariely Kothari

## 2022-05-12 NOTE — TELEPHONE ENCOUNTER
PT saw Shireen Larry NP on 4/13/22.     Last refill on 4/13/22 for #60    Routing refill request to provider for review/approval because:  Drug not on the [facility] refill protocol

## 2022-08-12 DIAGNOSIS — F90.0 ATTENTION DEFICIT HYPERACTIVITY DISORDER (ADHD), PREDOMINANTLY INATTENTIVE TYPE: ICD-10-CM

## 2022-08-12 RX ORDER — DEXTROAMPHETAMINE SACCHARATE, AMPHETAMINE ASPARTATE, DEXTROAMPHETAMINE SULFATE AND AMPHETAMINE SULFATE 5; 5; 5; 5 MG/1; MG/1; MG/1; MG/1
20 TABLET ORAL DAILY
Qty: 30 TABLET | Refills: 0 | Status: SHIPPED | OUTPATIENT
Start: 2022-08-12 | End: 2022-09-14

## 2022-08-12 NOTE — TELEPHONE ENCOUNTER
amphetamine-dextroamphetamine (ADDERALL) 20 MG tablet      Last Written Prescription Date:  05/12/22  Last Fill Quantity: 30,   # refills: 0  Last Office Visit: 04/13/22  Future Office visit:       Routing refill request to provider for review/approval because:  Drug not on the [facility], [facility] or [facility] refill protocol or controlled substance

## 2022-10-13 ENCOUNTER — NURSE TRIAGE (OUTPATIENT)
Dept: NURSING | Facility: CLINIC | Age: 47
End: 2022-10-13

## 2022-11-09 DIAGNOSIS — F90.0 ATTENTION DEFICIT HYPERACTIVITY DISORDER (ADHD), PREDOMINANTLY INATTENTIVE TYPE: ICD-10-CM

## 2022-11-09 RX ORDER — DEXTROAMPHETAMINE SACCHARATE, AMPHETAMINE ASPARTATE, DEXTROAMPHETAMINE SULFATE AND AMPHETAMINE SULFATE 5; 5; 5; 5 MG/1; MG/1; MG/1; MG/1
20 TABLET ORAL DAILY
Qty: 30 TABLET | Refills: 0 | OUTPATIENT
Start: 2022-11-09

## 2022-11-09 NOTE — LETTER
[facility]  [address] [facility] [address]  Phone: [phone]        November 15, 2022      Avril Piper                                                                                                                                [address]   [place] [facility] [address]            Dear Ms. Piper,    We are concerned about your health care. We have tried to contact you several times (using  all #'s )  Many medications require routine follow-up with your Doctor.      At this time we ask that: You schedule an office visit with your physician.  Your prescription: Cannot be refilled until the above noted follow up has been completed.      Thank you,    [facility]

## 2022-11-10 NOTE — TELEPHONE ENCOUNTER
Patient's home/cell number fast busy x 2.  Patient's daughter's cell number message on machine to have patient call back.

## 2022-11-15 NOTE — TELEPHONE ENCOUNTER
Attempted to contact Patient # Busy tried daughters # and a young man answer ans said that Patient was bot his mom . Unable top contact Patient , will send a letter .    JAKE Parks LPN

## 2022-11-22 NOTE — TELEPHONE ENCOUNTER
Patient has a follow up appointment scheduled. Please refill any medication until patient is seen.

## 2022-11-23 RX ORDER — DEXTROAMPHETAMINE SACCHARATE, AMPHETAMINE ASPARTATE, DEXTROAMPHETAMINE SULFATE AND AMPHETAMINE SULFATE 5; 5; 5; 5 MG/1; MG/1; MG/1; MG/1
20 TABLET ORAL DAILY
Qty: 30 TABLET | Refills: 0 | Status: SHIPPED | OUTPATIENT
Start: 2022-11-23 | End: 2022-12-21

## 2023-01-24 ENCOUNTER — OFFICE VISIT (OUTPATIENT)
Dept: INTERNAL MEDICINE | Facility: CLINIC | Age: 48
End: 2023-01-24
Payer: COMMERCIAL

## 2023-01-24 VITALS
RESPIRATION RATE: 16 BRPM | WEIGHT: 173 LBS | HEIGHT: 66 IN | HEART RATE: 85 BPM | TEMPERATURE: 98.2 F | DIASTOLIC BLOOD PRESSURE: 78 MMHG | SYSTOLIC BLOOD PRESSURE: 116 MMHG | BODY MASS INDEX: 27.8 KG/M2 | OXYGEN SATURATION: 100 %

## 2023-01-24 DIAGNOSIS — F90.0 ATTENTION DEFICIT HYPERACTIVITY DISORDER (ADHD), PREDOMINANTLY INATTENTIVE TYPE: Primary | ICD-10-CM

## 2023-01-24 DIAGNOSIS — Z79.899 ENCOUNTER FOR LONG-TERM (CURRENT) USE OF MEDICATIONS: ICD-10-CM

## 2023-01-24 LAB
CANNABINOIDS UR QL SCN: NORMAL
CREAT UR-MCNC: 192 MG/DL

## 2023-01-24 PROCEDURE — 80307 DRUG TEST PRSMV CHEM ANLYZR: CPT | Performed by: INTERNAL MEDICINE

## 2023-01-24 PROCEDURE — 80307 DRUG TEST PRSMV CHEM ANLYZR: CPT | Mod: 59 | Performed by: INTERNAL MEDICINE

## 2023-01-24 PROCEDURE — 99213 OFFICE O/P EST LOW 20 MIN: CPT | Performed by: INTERNAL MEDICINE

## 2023-01-24 RX ORDER — DEXTROAMPHETAMINE SACCHARATE, AMPHETAMINE ASPARTATE, DEXTROAMPHETAMINE SULFATE AND AMPHETAMINE SULFATE 5; 5; 5; 5 MG/1; MG/1; MG/1; MG/1
20 TABLET ORAL 2 TIMES DAILY
Qty: 60 TABLET | Refills: 0 | Status: SHIPPED | OUTPATIENT
Start: 2023-01-24 | End: 2023-03-01

## 2023-01-24 NOTE — LETTER
[facility]  01/24/23  Patient: Avril Piper  YOB: 1975  Medical Record Number: [id number]                                                                                  Non-Opioid Controlled Substance Agreement    This is an agreement between you and your provider regarding safe and appropriate use of controlled substances prescribed by your care team. Controlled substances are?medicines that can cause physical and mental dependence (abuse).     There are strict laws about having and using these medicines. We here at [facility] are  committed to working with you in your efforts to get better. To support you in this work, we'll help you schedule regular office appointments for medicine refills. If we must cancel or change your appointment for any reason, we'll make sure you have enough medicine to last until your next appointment.     As a Provider, I will:     Listen carefully to your concerns while treating you with respect.     Recommend a treatment plan that I believe is in your best interest and may involve therapies other than medicine.      Talk with you often about the possible benefits and the risk of harm of any medicine that we prescribe for you.    Assess the safety of this medicine and check how well it works.      Provide a plan on how to taper (discontinue or go off) using this medicine if the decision is made to stop its use.      ::  As a Patient, I understand controlled substances:       Are prescribed by my care provider to help me function or work and manage my condition(s).?    Are strong medicines and can cause serious side effects.       Need to be taken exactly as prescribed.?Combining controlled substances with certain medicines or chemicals (such as illegal drugs, alcohol, sedatives, sleeping pills, and benzodiazepines) can be dangerous or even fatal.? If I stop taking my medicines suddenly, I may have severe withdrawal symptoms.     The risks,  benefits, and side effects of these medicine(s) were explained to me. I agree that:    1. I will take part in other treatments as advised by my care team. This may be psychiatry or counseling, physical therapy, behavioral therapy, group treatment or a referral to specialist.    2. I will keep all my appointments and understand this is part of the monitoring of controlled substances.?My care team may require an office visit for EVERY controlled substance refill. If I miss appointments or don t follow instructions, my care team may stop my medicine    3. I will take my medicines as prescribed. I will not change the dose or schedule unless my care team tells me to. There will be no refills if I run out early.      4. I may be asked to come to the clinic and complete a urine drug test or complete a pill count. If I don t give a urine sample or participate in a pill count, the care team may stop my medicine.    5. I will only receive controlled substance prescriptions from this clinic. If I am treated by another provider, I will tell them that I am taking controlled substances and that I have a treatment agreement with this provider. I will inform my [facility] care team within one business day if I am given a prescription for any controlled substance by another healthcare provider. My [facility] care team can contact other providers and pharmacists about my use of any medicines.    6. It is up to me to make sure that I don't run out of my medicines on weekends or holidays.?If my care team is willing to refill my prescription without a visit, I must request refills only during office hours. Refills may take up to 3 business days to process. I will use one pharmacy to fill all my controlled substance prescriptions. I will notify the clinic about any changes to my insurance or medicine availability.    7. I am responsible for my prescriptions. If the medicine/prescription is lost, stolen or destroyed, it will  not be replaced.?I also agree not to share controlled substance medicines with anyone.     8. I am aware I should not use any illegal or recreational drugs. I agree not to drink alcohol unless my care team says I can.     9. If I enroll in the [place] Medical Cannabis program, I will tell my care team before my next refill.    10. I will tell my care team right away if I become pregnant, have a new medical problem treated outside of my regular clinic, or have a change in my medicines.     11. I understand that this medicine can affect my thinking, judgment and reaction time.? Alcohol and drugs affect the brain and body, which can affect the safety of my driving. Being under the influence of alcohol or drugs can affect my decision-making, behaviors, personal safety and the safety of others. Driving while impaired (DWI) can occur if a person is driving, operating or in physical control of a car, motorcycle, boat, snowmobile, ATV, motorbike, off-road vehicle or any other motor vehicle ([place] Statute 169A.20). I understand the risk if I choose to drive or operate any vehicle or machinery.    I understand that if I do not follow any of the conditions above, my prescriptions or treatment may be stopped or changed.   I agree that my provider, clinic care team and pharmacy may work with any city, state or federal law enforcement agency that investigates the misuse, sale or other diversion of my controlled medicine. I will allow my provider to discuss my care with, or share a copy of, this agreement with any other treating provider, pharmacy or emergency room where I receive care.     I have read this agreement and have asked questions about anything I did not understand.    ________________________________________________________  Patient Signature - Avril Piper     ___________________                   Date     ________________________________________________________  Provider Signature - Denise Jon MD        ___________________                   Date     ________________________________________________________  Witness Signature (required if provider not present while patient signing)          ___________________                   Date

## 2023-01-24 NOTE — PROGRESS NOTES
"  Assessment & Plan     Attention deficit hyperactivity disorder (ADHD), predominantly inattentive type  Will try adding an afternoon dose if she has troubel to sleep she will try moving it up a couple hours. She signed a medication agreement and agreed to get a urine urine test today.   - amphetamine-dextroamphetamine (ADDERALL) 20 MG tablet; Take 1 tablet (20 mg) by mouth 2 times daily  - Drug Confirmation Panel Urine with Creat - lab collect; Future  - Drug Confirmation Panel Urine with Creat - lab collect    Encounter for long-term (current) use of medications     - Drug Confirmation Panel Urine with Creat - lab collect; Future  - Drug Confirmation Panel Urine with Creat - lab collect           BMI:   Estimated body mass index is 28.35 kg/m  as calculated from the following:    Height as of this encounter: 1.664 m (5' 5.5\").    Weight as of this encounter: 78.5 kg (173 lb).       No follow-ups on file.    Denise Jon MD  [facility] WILLIAM Mackenzie is a 47 year old, presenting for the following health issues:  RECHECK and A.D.H.D      HPI     The Adderall has been working well but wears off in the middle of the afternoon. She would like to try adding a second dose.  She has no problems with decreased appetite, anxiety palpitations or insomnia.       Review of Systems   Constitutional, HEENT, cardiovascular, pulmonary, GI, , musculoskeletal, neuro, skin, endocrine and psych systems are negative, except as otherwise noted.      Objective    /78   Pulse 85   Temp 98.2  F (36.8  C) (Oral)   Resp 16   Ht 1.664 m (5' 5.5\")   Wt 78.5 kg (173 lb)   LMP 12/13/2022   SpO2 100%   Breastfeeding No   BMI 28.35 kg/m    Body mass index is 28.35 kg/m .  Physical Exam   GENERAL: healthy, alert and no distress  NECK: no adenopathy, no asymmetry, masses, or scars and thyroid normal to palpation  RESP: lungs clear to auscultation - no rales, rhonchi or wheezes  CV: regular rate " and rhythm, normal S1 S2, no S3 or S4, no murmur, click or rub, no peripheral edema and peripheral pulses strong  ABDOMEN: soft, nontender, no hepatosplenomegaly, no masses and bowel sounds normal  MS: no gross musculoskeletal defects noted, no edema

## 2023-01-26 LAB
AMPHET UR CFM-MCNC: ABNORMAL NG/ML
AMPHET/CREAT UR: ABNORMAL

## 2023-02-28 DIAGNOSIS — F90.0 ATTENTION DEFICIT HYPERACTIVITY DISORDER (ADHD), PREDOMINANTLY INATTENTIVE TYPE: ICD-10-CM

## 2023-03-01 RX ORDER — DEXTROAMPHETAMINE SACCHARATE, AMPHETAMINE ASPARTATE, DEXTROAMPHETAMINE SULFATE AND AMPHETAMINE SULFATE 5; 5; 5; 5 MG/1; MG/1; MG/1; MG/1
20 TABLET ORAL 2 TIMES DAILY
Qty: 60 TABLET | Refills: 0 | Status: SHIPPED | OUTPATIENT
Start: 2023-03-01 | End: 2023-03-27

## 2023-03-27 DIAGNOSIS — F90.0 ATTENTION DEFICIT HYPERACTIVITY DISORDER (ADHD), PREDOMINANTLY INATTENTIVE TYPE: ICD-10-CM

## 2023-03-27 RX ORDER — DEXTROAMPHETAMINE SACCHARATE, AMPHETAMINE ASPARTATE, DEXTROAMPHETAMINE SULFATE AND AMPHETAMINE SULFATE 5; 5; 5; 5 MG/1; MG/1; MG/1; MG/1
20 TABLET ORAL 2 TIMES DAILY
Qty: 60 TABLET | Refills: 0 | Status: SHIPPED | OUTPATIENT
Start: 2023-03-30 | End: 2023-05-04

## 2023-04-20 ENCOUNTER — PATIENT OUTREACH (OUTPATIENT)
Dept: CARE COORDINATION | Facility: CLINIC | Age: 48
End: 2023-04-20
Payer: COMMERCIAL

## 2023-05-01 DIAGNOSIS — F90.0 ATTENTION DEFICIT HYPERACTIVITY DISORDER (ADHD), PREDOMINANTLY INATTENTIVE TYPE: ICD-10-CM

## 2023-05-01 NOTE — LETTER
May 5, 2023      Avril Piper  [address] [place] [address]        To Whom It May Concern:    Avril Piper is currently being prescribed Adderall medication for underlying concerns of ADHD.  Symptoms as well as medication dosing being monitored by the clinic.  Please do not hesitate to reach out if there is any further questions or concerns.        Sincerely,        Holly Aguirre MD

## 2023-05-04 RX ORDER — DEXTROAMPHETAMINE SACCHARATE, AMPHETAMINE ASPARTATE, DEXTROAMPHETAMINE SULFATE AND AMPHETAMINE SULFATE 5; 5; 5; 5 MG/1; MG/1; MG/1; MG/1
20 TABLET ORAL 2 TIMES DAILY
Qty: 60 TABLET | Refills: 0 | Status: SHIPPED | OUTPATIENT
Start: 2023-05-04 | End: 2023-08-02

## 2023-05-05 RX ORDER — DEXTROAMPHETAMINE SACCHARATE, AMPHETAMINE ASPARTATE, DEXTROAMPHETAMINE SULFATE AND AMPHETAMINE SULFATE 3.75; 3.75; 3.75; 3.75 MG/1; MG/1; MG/1; MG/1
15 TABLET ORAL 2 TIMES DAILY
Qty: 60 TABLET | Refills: 0 | Status: SHIPPED | OUTPATIENT
Start: 2023-05-05 | End: 2023-06-05

## 2023-05-05 NOTE — TELEPHONE ENCOUNTER
Pharmacy is all out of the adderall 20mg. They do have the 15mg. Can we write a new rx for 15mg twice daily instead?. Patient is agreeable to this plan.    Patient also need a letter stating she is prescribed and taking this medication. This is for a legal matter she has going on. Mail the letter to her home.

## 2023-06-01 DIAGNOSIS — F90.0 ATTENTION DEFICIT HYPERACTIVITY DISORDER (ADHD), PREDOMINANTLY INATTENTIVE TYPE: ICD-10-CM

## 2023-06-05 RX ORDER — DEXTROAMPHETAMINE SACCHARATE, AMPHETAMINE ASPARTATE, DEXTROAMPHETAMINE SULFATE AND AMPHETAMINE SULFATE 3.75; 3.75; 3.75; 3.75 MG/1; MG/1; MG/1; MG/1
15 TABLET ORAL 2 TIMES DAILY
Qty: 60 TABLET | Refills: 0 | Status: SHIPPED | OUTPATIENT
Start: 2023-06-05

## 2023-07-31 DIAGNOSIS — F90.0 ATTENTION DEFICIT HYPERACTIVITY DISORDER (ADHD), PREDOMINANTLY INATTENTIVE TYPE: ICD-10-CM

## 2023-08-02 RX ORDER — DEXTROAMPHETAMINE SACCHARATE, AMPHETAMINE ASPARTATE, DEXTROAMPHETAMINE SULFATE AND AMPHETAMINE SULFATE 5; 5; 5; 5 MG/1; MG/1; MG/1; MG/1
20 TABLET ORAL 2 TIMES DAILY
Qty: 60 TABLET | Refills: 0 | Status: SHIPPED | OUTPATIENT
Start: 2023-08-02